# Patient Record
Sex: FEMALE | Race: WHITE
[De-identification: names, ages, dates, MRNs, and addresses within clinical notes are randomized per-mention and may not be internally consistent; named-entity substitution may affect disease eponyms.]

---

## 2019-04-01 ENCOUNTER — HOSPITAL ENCOUNTER (INPATIENT)
Dept: HOSPITAL 62 - ER | Age: 44
LOS: 4 days | Discharge: HOME | DRG: 203 | End: 2019-04-05
Attending: FAMILY MEDICINE | Admitting: FAMILY MEDICINE
Payer: SELF-PAY

## 2019-04-01 DIAGNOSIS — J45.901: Primary | ICD-10-CM

## 2019-04-01 DIAGNOSIS — Z88.2: ICD-10-CM

## 2019-04-01 DIAGNOSIS — D72.829: ICD-10-CM

## 2019-04-01 DIAGNOSIS — Z79.52: ICD-10-CM

## 2019-04-01 DIAGNOSIS — Z79.51: ICD-10-CM

## 2019-04-01 DIAGNOSIS — Z90.710: ICD-10-CM

## 2019-04-01 LAB
ADD MANUAL DIFF: NO
ALBUMIN SERPL-MCNC: 3.9 G/DL (ref 3.5–5)
ALP SERPL-CCNC: 99 U/L (ref 38–126)
ALT SERPL-CCNC: 20 U/L (ref 9–52)
ANION GAP SERPL CALC-SCNC: 9 MMOL/L (ref 5–19)
ARTERIAL BLOOD FIO2: (no result)
ARTERIAL BLOOD H2CO3: 1 MMOL/L (ref 1.05–1.35)
ARTERIAL BLOOD HCO3: 21.3 MMOL/L (ref 20–24)
ARTERIAL BLOOD PCO2: 33.1 MMHG (ref 35–45)
ARTERIAL BLOOD PH: 7.43 (ref 7.35–7.45)
ARTERIAL BLOOD PO2: 62.3 MMHG (ref 80–100)
ARTERIAL BLOOD TOTAL CO2: 22.3 MMOL/L (ref 21–25)
AST SERPL-CCNC: 15 U/L (ref 14–36)
BASE EXCESS BLDA CALC-SCNC: -2.3 MMOL/L
BASOPHILS # BLD AUTO: 0.1 10^3/UL (ref 0–0.2)
BASOPHILS NFR BLD AUTO: 0.4 % (ref 0–2)
BILIRUB DIRECT SERPL-MCNC: 0.2 MG/DL (ref 0–0.4)
BILIRUB SERPL-MCNC: 0.6 MG/DL (ref 0.2–1.3)
BUN SERPL-MCNC: 8 MG/DL (ref 7–20)
CALCIUM: 9.6 MG/DL (ref 8.4–10.2)
CHLORIDE SERPL-SCNC: 108 MMOL/L (ref 98–107)
CO2 SERPL-SCNC: 22 MMOL/L (ref 22–30)
EOSINOPHIL # BLD AUTO: 0.3 10^3/UL (ref 0–0.6)
EOSINOPHIL NFR BLD AUTO: 1.8 % (ref 0–6)
ERYTHROCYTE [DISTWIDTH] IN BLOOD BY AUTOMATED COUNT: 14.3 % (ref 11.5–14)
GLUCOSE SERPL-MCNC: 150 MG/DL (ref 75–110)
HCT VFR BLD CALC: 39.6 % (ref 36–47)
HGB BLD-MCNC: 13.1 G/DL (ref 12–15.5)
LYMPHOCYTES # BLD AUTO: 0.8 10^3/UL (ref 0.5–4.7)
LYMPHOCYTES NFR BLD AUTO: 5.3 % (ref 13–45)
MCH RBC QN AUTO: 29.3 PG (ref 27–33.4)
MCHC RBC AUTO-ENTMCNC: 33 G/DL (ref 32–36)
MCV RBC AUTO: 89 FL (ref 80–97)
MONOCYTES # BLD AUTO: 0.4 10^3/UL (ref 0.1–1.4)
MONOCYTES NFR BLD AUTO: 2.3 % (ref 3–13)
NEUTROPHILS # BLD AUTO: 13.5 10^3/UL (ref 1.7–8.2)
NEUTS SEG NFR BLD AUTO: 90.2 % (ref 42–78)
PLATELET # BLD: 392 10^3/UL (ref 150–450)
POTASSIUM SERPL-SCNC: 4.2 MMOL/L (ref 3.6–5)
PROT SERPL-MCNC: 7.2 G/DL (ref 6.3–8.2)
RBC # BLD AUTO: 4.46 10^6/UL (ref 3.72–5.28)
SAO2 % BLDA: 92.7 % (ref 94–98)
SODIUM SERPL-SCNC: 138.7 MMOL/L (ref 137–145)
TOTAL CELLS COUNTED % (AUTO): 100 %
WBC # BLD AUTO: 15 10^3/UL (ref 4–10.5)

## 2019-04-01 PROCEDURE — 36415 COLL VENOUS BLD VENIPUNCTURE: CPT

## 2019-04-01 PROCEDURE — 87070 CULTURE OTHR SPECIMN AEROBIC: CPT

## 2019-04-01 PROCEDURE — 80048 BASIC METABOLIC PNL TOTAL CA: CPT

## 2019-04-01 PROCEDURE — 96361 HYDRATE IV INFUSION ADD-ON: CPT

## 2019-04-01 PROCEDURE — 83735 ASSAY OF MAGNESIUM: CPT

## 2019-04-01 PROCEDURE — 80053 COMPREHEN METABOLIC PANEL: CPT

## 2019-04-01 PROCEDURE — 96375 TX/PRO/DX INJ NEW DRUG ADDON: CPT

## 2019-04-01 PROCEDURE — 71046 X-RAY EXAM CHEST 2 VIEWS: CPT

## 2019-04-01 PROCEDURE — 83605 ASSAY OF LACTIC ACID: CPT

## 2019-04-01 PROCEDURE — 94640 AIRWAY INHALATION TREATMENT: CPT

## 2019-04-01 PROCEDURE — 96374 THER/PROPH/DIAG INJ IV PUSH: CPT

## 2019-04-01 PROCEDURE — 36600 WITHDRAWAL OF ARTERIAL BLOOD: CPT

## 2019-04-01 PROCEDURE — 87040 BLOOD CULTURE FOR BACTERIA: CPT

## 2019-04-01 PROCEDURE — 99285 EMERGENCY DEPT VISIT HI MDM: CPT

## 2019-04-01 PROCEDURE — 82803 BLOOD GASES ANY COMBINATION: CPT

## 2019-04-01 PROCEDURE — 87205 SMEAR GRAM STAIN: CPT

## 2019-04-01 PROCEDURE — 83880 ASSAY OF NATRIURETIC PEPTIDE: CPT

## 2019-04-01 PROCEDURE — 94762 N-INVAS EAR/PLS OXIMTRY CONT: CPT

## 2019-04-01 PROCEDURE — 85025 COMPLETE CBC W/AUTO DIFF WBC: CPT

## 2019-04-01 RX ADMIN — AZITHROMYCIN MONOHYDRATE SCH MLS/HR: 500 INJECTION, POWDER, LYOPHILIZED, FOR SOLUTION INTRAVENOUS at 21:29

## 2019-04-01 RX ADMIN — SODIUM CHLORIDE PRN MLS/HR: 9 INJECTION, SOLUTION INTRAVENOUS at 18:08

## 2019-04-01 RX ADMIN — ALBUTEROL SULFATE SCH MG: 2.5 SOLUTION RESPIRATORY (INHALATION) at 09:38

## 2019-04-01 RX ADMIN — ALBUTEROL SULFATE SCH MG: 2.5 SOLUTION RESPIRATORY (INHALATION) at 09:26

## 2019-04-01 RX ADMIN — FAMOTIDINE SCH MG: 20 TABLET, FILM COATED ORAL at 21:25

## 2019-04-01 RX ADMIN — CEFEPIME HYDROCHLORIDE SCH MLS/HR: 2 INJECTION, SOLUTION INTRAVENOUS at 18:09

## 2019-04-01 RX ADMIN — METHYLPREDNISOLONE SODIUM SUCCINATE SCH MG: 125 INJECTION, POWDER, FOR SOLUTION INTRAMUSCULAR; INTRAVENOUS at 21:25

## 2019-04-01 RX ADMIN — LEVALBUTEROL HYDROCHLORIDE SCH MG: 1.25 SOLUTION RESPIRATORY (INHALATION) at 19:57

## 2019-04-01 NOTE — PDOC H&P
History of Present Illness


Admission Date/PCP: 


  04/01/19 15:07





  OFE ACEVEDO MD





Patient complains of: Cough and wheezing


History of Present Illness: 


BRET LABOY is a 43 year old female


This is a 43-year-old female with a significant history of the asthma with the 

multiple hospital admissions for the acute exacerbations currently on a steroid 

dependence prednisone 20 mg p.o. daily see Dr. Guthrie's pretty much as 

outpatient seen couple of weeks back seen by myself in the last hospital 

admission in August did not following office came to the emergency department 

with a complaining of cough congestions and increasing more productivity of the 

sputum


Patient's denied any fever or chills


In the emergency department patient oxygen saturation is 90-93%'s and dropped to

up to 89%'s patients received a several respiratory treatments Solu-Medrol and 

IV magnesium's


Patient's chest x-ray was clear


At this point discussed with Dr. Guthrie's patient's pulmonary and suggest a 

hospital admission give IV Solu-Medrol and respiratory treatments did not think 

patient need a CT of the chest today


When I saw the patient in the emergency department as usual still quite a bit 

wheezing


Patient is denied any chest pain


Patient's denied recently any travel or contact with any sick persons


At this point decided to admit the patient's to continues to monitor continues 

to pulse ox and oxygen supplements





Past Medical History


Cardiac Medical History: 


   Denies: Coronary Artery Disease, Myocardial Infarction, Hypertension


Pulmonary Medical History: Reports: Asthma - Hospitalized March & April 2015, 

Pneumonia - 3-4 yrs ago


   Denies: Bronchitis, Chronic Obstructive Pulmonary Disease (COPD), Intubation,

Tuberculosis


Neurological Medical History: 


   Denies: Seizures


Musculoskeltal Medical History: 


   Denies: Arthritis


Hematology: Reports: Anemia - Currently





Past Surgical History


Past Surgical History: Reports: Hysterectomy, Orthopedic Surgery - RIGHT ANKLE 

ORIF, Tubal Ligation


   Denies: Pacemaker





Social History


Lives with: Family


Smoking Status: Never Smoker


Frequency of Alcohol Use: None


Hx Recreational Drug Use: No


Drugs: None


Hx Prescription Drug Abuse: No





Family History


Family History: Reviewed & Not Pertinent


Parental Family History Reviewed: Yes


Children Family History Reviewed: Yes


Sibling(s) Family History Reviewed.: Yes





Medication/Allergy


Home Medications: 








Albuterol Sulfate [Ventolin 0.083% Neb 2.5 mg/3 ml Ampul] 1 vial NEB Q4HP PRN 

04/01/19 


Prednisone [Deltasone 20 mg Tablet] 20 mg PO DAILY 04/01/19 


Tiotropium Bromide [Spiriva Handihaler 5 Cap/Kit (18 Mcg/Cap)] 1 cap IH Q12 

04/01/19 








Allergies/Adverse Reactions: 


                                        





Sulfa (Sulfonamide Antibiotics) Allergy (Verified 04/01/19 08:27)


   











Review of Systems


Constitutional: ABSENT: chills, fever(s), headache(s), weight gain, weight loss


Eyes: ABSENT: visual disturbances


Ears: ABSENT: hearing changes


Cardiovascular: PRESENT: dyspnea on exertion.  ABSENT: chest pain, edema, 

orthropnea, palpitations


Respiratory: PRESENT: cough, dyspnea, sputum.  ABSENT: hemoptysis


Gastrointestinal: ABSENT: abdominal pain, constipation, diarrhea, hematemesis, 

hematochezia, nausea, vomiting


Genitourinary: ABSENT: dysuria, hematuria


Musculoskeletal: ABSENT: joint swelling


Integumentary: ABSENT: rash, wounds


Neurological: ABSENT: abnormal gait, abnormal speech, confusion, dizziness, 

focal weakness, syncope


Psychiatric: ABSENT: anxiety, depression, homidical ideation, suicidal ideation


Endocrine: ABSENT: cold intolerance, heat intolerance, menstrual abnormalities, 

polydipsia, polyuria


Hematologic/Lymphatic: ABSENT: easy bleeding, easy bruising, lymphadenopathy





Physical Exam


Vital Signs: 


                                        











Temp Pulse Resp BP Pulse Ox


 


 98.2 F   106 H  19   132/78 H  95 


 


 04/01/19 08:33  04/01/19 08:33  04/01/19 12:00  04/01/19 10:01  04/01/19 12:00








                                 Intake & Output











 03/31/19 04/01/19 04/02/19





 06:59 06:59 06:59


 


Intake Total   1100


 


Balance   1100


 


Weight   111.4 kg











General appearance: PRESENT: no acute distress, well-developed, well-nourished


Head exam: PRESENT: atraumatic, normocephalic


Eye exam: PRESENT: conjunctiva pink, EOMI, PERRLA.  ABSENT: scleral icterus


Ear exam: PRESENT: normal external ear exam


Mouth exam: PRESENT: moist, tongue midline


Neck exam: PRESENT: full ROM.  ABSENT: carotid bruit, JVD, lymphadenopathy, 

thyromegaly


Respiratory exam: PRESENT: decreased breath sounds, unlabored, wheezes


Cardiovascular exam: PRESENT: RRR, tachycardia.  ABSENT: diastolic murmur, rubs,

systolic murmur


Vascular exam: PRESENT: normal capillary refill


GI/Abdominal exam: PRESENT: normal bowel sounds, soft.  ABSENT: distended, 

guarding, mass, organolmegaly, rebound, tenderness


Rectal exam: PRESENT: deferred


Extremities exam: ABSENT: pedal edema


Neurological exam: PRESENT: alert, awake, oriented to person, oriented to place,

oriented to time, oriented to situation, CN II-XII grossly intact.  ABSENT: 

motor sensory deficit


Psychiatric exam: PRESENT: appropriate affect, normal mood.  ABSENT: homicidal 

ideation, suicidal ideation


Skin exam: PRESENT: dry, intact, warm.  ABSENT: cyanosis, rash





Results


Laboratory Results: 


                                        





                                 04/01/19 09:45 





                                 04/01/19 09:45 





                                        











  04/01/19 04/01/19 04/01/19





  09:16 09:45 09:45


 


WBC   15.0 H 


 


RBC   4.46 


 


Hgb   13.1 


 


Hct   39.6 


 


MCV   89 


 


MCH   29.3 


 


MCHC   33.0 


 


RDW   14.3 H 


 


Plt Count   392 


 


Seg Neutrophils %   90.2 H 


 


Lymphocytes %   5.3 L 


 


Monocytes %   2.3 L 


 


Eosinophils %   1.8 


 


Basophils %   0.4 


 


Absolute Neutrophils   13.5 H 


 


Absolute Lymphocytes   0.8 


 


Absolute Monocytes   0.4 


 


Absolute Eosinophils   0.3 


 


Absolute Basophils   0.1 


 


Carbonic Acid  1.00 L  


 


HCO3/H2CO3 Ratio  21:1  


 


ABG pH  7.43  


 


ABG pCO2  33.1 L  


 


ABG pO2  62.3 L  


 


ABG HCO3  21.3  


 


ABG O2 Saturation  92.7 L  


 


ABG Base Excess  -2.3  


 


FiO2  ROOM AIR  


 


Sodium    138.7


 


Potassium    4.2


 


Chloride    108 H


 


Carbon Dioxide    22


 


Anion Gap    9


 


BUN    8


 


Creatinine    0.73


 


Est GFR ( Amer)    > 60


 


Est GFR (Non-Af Amer)    > 60


 


Glucose    150 H


 


Calcium    9.6


 


Magnesium    2.2


 


Total Bilirubin    0.6


 


AST    15


 


ALT    20


 


Alkaline Phosphatase    99


 


Total Protein    7.2


 


Albumin    3.9











Impressions: 


                                        





Chest X-Ray  04/01/19 08:55


IMPRESSION:  NO ACUTE RADIOGRAPHIC FINDING IN THE CHEST.


 














Assessment & Plan





- Diagnosis


(1) Asthma exacerbation


Qualifiers: 


   Asthma severity: unspecified severity   Asthma persistence: persistent   

Qualified Code(s): J45.901 - Unspecified asthma with (acute) exacerbation   


Is this a current diagnosis for this admission?: Yes   


Plan: 


We will start the patient on IV Solu-Medrol and respiratory treatment continuous

pulse ox monitor


Discussed with the Dr. Guthrie's consult with him following the hospitals








(2) Cough


Is this a current diagnosis for this admission?: Yes   


Plan: 


Will get the sputum culture


Cover with antibiotic








(3) Leukocytosis


Qualifiers: 


   Leukocytosis type: unspecified   Qualified Code(s): D72.829 - Elevated white 

blood cell count, unspecified   


Is this a current diagnosis for this admission?: Yes   


Plan: 


Most likely related to this is chronic steroid


Will get the sputum culture blood culture


Get the lactic acid


Cover the antibiotics until the cultures back








- Time


Time Spent: 30 to 50 Minutes


Medications reviewed and adjusted accordingly: Yes


Anticipated discharge: Home


Within: Other





- Inpatient Certification


Based on my medical assessment, after consideration of the patient's 

comorbidities, presenting symptoms, or acuity I expect that the services needed 

warrant INPATIENT care.: Yes


I certify that my determination is in accordance with my understanding of 

Medicare's requirements for reasonable and necessary INPATIENT services [42 CFR 

412.3e].: Yes


Medical Necessity: Significant Comorbidiites Make Outpatient Treatment Too 

Risky, Need Close Monitoring Due to Risk of Patient Decompensation, Need For IV 

Fluids, Need for IV Antibiotics


Post Hospital Care: D/C Planner Documentation





- Plan Summary


Plan Summary: 





Admit the patient in a telemetry bed


See MD orders

## 2019-04-01 NOTE — RADIOLOGY REPORT (SQ)
EXAM DESCRIPTION:  CHEST 2 VIEWS



COMPLETED DATE/TIME:  4/1/2019 11:00 am



REASON FOR STUDY:  sob,cough



COMPARISON:  3/21/2018



EXAM PARAMETERS:  NUMBER OF VIEWS: two views

TECHNIQUE: Digital Frontal and Lateral radiographic views of the chest acquired.

RADIATION DOSE: NA

LIMITATIONS: none



FINDINGS:  LUNGS AND PLEURA: No opacities, masses or pneumothorax. No pleural effusion.

MEDIASTINUM AND HILAR STRUCTURES: No masses or contour abnormalities.

HEART AND VASCULAR STRUCTURES: Heart normal size.  No evidence for failure.

BONES: No acute findings.

HARDWARE: None in the chest.

OTHER: No other significant finding.



IMPRESSION:  NO ACUTE RADIOGRAPHIC FINDING IN THE CHEST.



TECHNICAL DOCUMENTATION:  JOB ID:  [id number]

 2011 [employer]- All Rights Reserved



Reading location - IP/workstation name: [email or web address]

## 2019-04-01 NOTE — ER DOCUMENT REPORT
ED Respiratory Problem





- General


Chief Complaint: Shortness Of Breath


Stated Complaint: WHEEZING/DIFFICULTY BREATHING


Time Seen by Provider: 04/01/19 08:42


Mode of Arrival: Ambulatory


Information source: Patient


Notes: 





Patient presents with a 3-day history of cough and wheezing.  Patient states 

that the cough has been productive.  Patient denies any fever.  Patient has a 

history of asthma that has been difficult to control.  Patient states she has 

been taking prednisone 20 mg daily to help with her symptoms for over the past 

year.  Patient denies any recent changes in her medications.


TRAVEL OUTSIDE OF THE [place] IN LAST 30 DAYS: No





- HPI


Patient complains to provider of: Asthma, Cough


Onset: Other - 3 days


Duration: Continuous


Quality of pain: Other - Tightness


Pain Level: 1


Context: Hx asthma.  denies: Hx CHF, Hx COPD, Recent cardiac event, Recent long 

distance trvl, Recent immobilization


Chest pain/discomfort: Tightness


Cough: Productive


Sputum color: Yellow


At home treatment: Bronchodilators


Associated symptoms: Allergy/hay fever, Congestion, Cough, Short of breath, 

Wheezing.  denies: Anxiety, Chest pain/discomfort, Earache, Fever


Similar symptoms previously: Yes


Recently seen / treated by doctor: No





- Related Data


Allergies/Adverse Reactions: 


                                        





Sulfa (Sulfonamide Antibiotics) Allergy (Verified 04/01/19 08:27)


   











Past Medical History





- General


Information source: Patient





- Social History


Smoking Status: Never Smoker


Frequency of alcohol use: None


Drug Abuse: None


Occupation: 


Lives with: Family


Family History: Reviewed & Not Pertinent


Patient has suicidal ideation: No


Patient has homicidal ideation: No





- Past Medical History


Cardiac Medical History: 


   Denies: Hx Coronary Artery Disease, Hx Heart Attack, Hx Hypertension


Pulmonary Medical History: Reports: Hx Asthma - Hospitalized March & April 2015,

Hx Pneumonia - 3-4 yrs ago


   Denies: Hx Intubation


Renal/ Medical History: Denies: Hx Peritoneal Dialysis


Musculoskeletal Medical History: Denies Hx Arthritis


Past Surgical History: Reports: Hx Breast Surgery - left breast lump removed, Hx

Genitourinary Surgery - Bladder tuck, Hx Hysterectomy, Hx Orthopedic Surgery - 

RIGHT ANKLE ORIF, Hx Tubal Ligation





- Immunizations


Hx Diphtheria, Pertussis, Tetanus Vaccination: Yes


Hx Pneumococcal Vaccination: 03/30/15





Review of Systems





- Review of Systems


Constitutional: No symptoms reported.  denies: Fever, Recent illness


EENT: Nose congestion, Nose discharge.  denies: Throat pain


Cardiovascular: No symptoms reported


Respiratory: Cough, Wheezing


Gastrointestinal: No symptoms reported.  denies: Abdominal pain, Vomiting


Genitourinary: No symptoms reported


Female Genitourinary: No symptoms reported


Musculoskeletal: No symptoms reported.  denies: Back pain


Skin: No symptoms reported


Hematologic/Lymphatic: No symptoms reported


Neurological/Psychological: No symptoms reported





Physical Exam





- Vital signs


Vitals: 


                                        











Temp Pulse Resp BP Pulse Ox


 


 98.2 F   106 H  20   134/83 H  93 


 


 04/01/19 08:33  04/01/19 08:33  04/01/19 08:33  04/01/19 08:33  04/01/19 08:33














- General


General appearance: Alert


In distress: Mild





- HEENT


Head: Normocephalic, Atraumatic


Eyes: Normal


Conjunctiva: Normal


Ears: Normal


Nasal: Clear rhinorrhea


Mouth/Lips: Normal


Mucous membranes: Normal


Pharynx: Normal.  No: Potential airway comprom.


Neck: Normal, Supple.  No: Lymphadenopathy, Meningismus





- Respiratory


Respiratory status: Tachypnea


Chest status: Nontender


Breath sounds: Nonproductive cough, Wheezing


Chest palpation: Normal





- Cardiovascular


Rhythm: Tachycardia


Heart sounds: S1 appreciated, S2 appreciated


Murmur: No





- Back


Back: Normal, Nontender





- Extremities


General upper extremity: Normal inspection, Normal ROM


General lower extremity: Normal inspection, Normal ROM





- Neurological


Neuro grossly intact: Yes


Cognition: Normal


Bridgman Coma Scale Eye Opening: Spontaneous


Bridgman Coma Scale Verbal: Oriented


Jean [place] Coma Scale Motor: Obeys Commands


Jean [place] Coma Scale Total: 15





- Psychological


Associated symptoms: Normal affect, Normal mood





- Skin


Skin Temperature: Warm


Skin Moisture: Dry


Skin Color: Normal





Course





- Re-evaluation


Re-evalutation: 





04/01/19 10:32


Patient continues with diffuse bilateral wheezing.  Heart rate continues 

tachycardic with oxygen saturation at 93% on room air.  IV magnesium ordered.


04/01/19 14:10 Called and spoke with Dr. Tong regarding need for admission.  

Recommends consultation with Dr. Guthrie.





Called and spoke with Dr. Guthrie who states that given patient's presentation 

would recommend admission.


04/01/19 14:22


Patient standing at bedside, heart rate 127 with oxygen saturation 89-92%.  

Patient placed on oxygen 1 L.  Patient does continue with lateral wheezing.  

Call placed for consultation with Dr. Tong.


04/01/19 14:55


 paged Dr. Tong





04/01/19 15:03


Consulted again with Dr. Tong who agrees to accept patient to a telemetry bed.





- Vital Signs


Vital signs: 


                                        











Temp Pulse Resp BP Pulse Ox


 


 98.8 F   114 H  19   122/70   98 


 


 04/01/19 18:08  04/01/19 18:08  04/01/19 18:08  04/01/19 18:08  04/01/19 18:08














- Laboratory


Result Diagrams: 


                                 04/01/19 09:45





                                 04/01/19 09:45


Laboratory results interpreted by me: 


                                        











  04/01/19 04/01/19 04/01/19





  09:16 09:45 09:45


 


WBC   15.0 H 


 


RDW   14.3 H 


 


Seg Neutrophils %   90.2 H 


 


Lymphocytes %   5.3 L 


 


Monocytes %   2.3 L 


 


Absolute Neutrophils   13.5 H 


 


Carbonic Acid  1.00 L  


 


ABG pCO2  33.1 L  


 


ABG pO2  62.3 L  


 


ABG O2 Saturation  92.7 L  


 


Chloride    108 H


 


Glucose    150 H











                               Labs- Entire Visit











  04/01/19 04/01/19 04/01/19





  09:16 09:45 09:45


 


WBC   15.0 H 


 


RBC   4.46 


 


Hgb   13.1 


 


Hct   39.6 


 


MCV   89 


 


MCH   29.3 


 


MCHC   33.0 


 


RDW   14.3 H 


 


Plt Count   392 


 


Seg Neutrophils %   90.2 H 


 


Lymphocytes %   5.3 L 


 


Monocytes %   2.3 L 


 


Eosinophils %   1.8 


 


Basophils %   0.4 


 


Absolute Neutrophils   13.5 H 


 


Absolute Lymphocytes   0.8 


 


Absolute Monocytes   0.4 


 


Absolute Eosinophils   0.3 


 


Absolute Basophils   0.1 


 


Carbonic Acid  1.00 L  


 


HCO3/H2CO3 Ratio  21:1  


 


ABG pH  7.43  


 


ABG pCO2  33.1 L  


 


ABG pO2  62.3 L  


 


ABG HCO3  21.3  


 


ABG Total CO2  22.3  


 


ABG O2 Saturation  92.7 L  


 


ABG Base Excess  -2.3  


 


FiO2  ROOM AIR  


 


Sodium    138.7


 


Potassium    4.2


 


Chloride    108 H


 


Carbon Dioxide    22


 


Anion Gap    9


 


BUN    8


 


Creatinine    0.73


 


Est GFR ( Amer)    > 60


 


Est GFR (Non-Af Amer)    > 60


 


Glucose    150 H


 


Calcium    9.6


 


Magnesium    2.2


 


Total Bilirubin    0.6


 


Direct Bilirubin    0.2


 


Neonat Total Bilirubin    Not Reportable


 


Neonat Direct Bilirubin    Not Reportable


 


Neonat Indirect Bili    Not Reportable


 


AST    15


 


ALT    20


 


Alkaline Phosphatase    99


 


Total Protein    7.2


 


Albumin    3.9














- Diagnostic Test


Radiology reviewed: Reports reviewed





Discharge





- Discharge


Clinical Impression: 


Asthma exacerbation


Qualifiers:


 Asthma severity: unspecified severity Asthma persistence: persistent Qualified 

Code(s): J45.901 - Unspecified asthma with (acute) exacerbation





Leukocytosis


Qualifiers:


 Leukocytosis type: unspecified Qualified Code(s): D72.829 - Elevated white 

blood cell count, unspecified





Condition: Fair


Disposition: ADMITTED AS OBSERVATION


Admitting Provider: Tong


Unit Admitted: Telemetry

## 2019-04-02 LAB
ABSOLUTE LYMPHOCYTES# (MANUAL): 1.5 10^3/UL (ref 0.5–4.7)
ABSOLUTE MONOCYTES # (MANUAL): 0 10^3/UL (ref 0.1–1.4)
ABSOLUTE NEUTROPHILS# (MANUAL): 17.5 10^3/UL (ref 1.7–8.2)
ADD MANUAL DIFF: YES
ANION GAP SERPL CALC-SCNC: 10 MMOL/L (ref 5–19)
ANISOCYTOSIS BLD QL SMEAR: SLIGHT
ARTERIAL BLOOD FIO2: (no result)
ARTERIAL BLOOD H2CO3: 0.93 MMOL/L (ref 1.05–1.35)
ARTERIAL BLOOD HCO3: 19.8 MMOL/L (ref 20–24)
ARTERIAL BLOOD PCO2: 30.8 MMHG (ref 35–45)
ARTERIAL BLOOD PH: 7.43 (ref 7.35–7.45)
ARTERIAL BLOOD PO2: 62.2 MMHG (ref 80–100)
ARTERIAL BLOOD TOTAL CO2: 20.7 MMOL/L (ref 21–25)
BASE EXCESS BLDA CALC-SCNC: -3.5 MMOL/L
BASOPHILS NFR BLD MANUAL: 0 % (ref 0–2)
BUN SERPL-MCNC: 12 MG/DL (ref 7–20)
BURR CELLS BLD QL SMEAR: SLIGHT
CALCIUM: 9.8 MG/DL (ref 8.4–10.2)
CHLORIDE SERPL-SCNC: 111 MMOL/L (ref 98–107)
CO2 SERPL-SCNC: 18 MMOL/L (ref 22–30)
EOSINOPHIL NFR BLD MANUAL: 0 % (ref 0–6)
ERYTHROCYTE [DISTWIDTH] IN BLOOD BY AUTOMATED COUNT: 14.2 % (ref 11.5–14)
GLUCOSE SERPL-MCNC: 175 MG/DL (ref 75–110)
HCT VFR BLD CALC: 39.3 % (ref 36–47)
HGB BLD-MCNC: 12.7 G/DL (ref 12–15.5)
MCH RBC QN AUTO: 28.3 PG (ref 27–33.4)
MCHC RBC AUTO-ENTMCNC: 32.2 G/DL (ref 32–36)
MCV RBC AUTO: 88 FL (ref 80–97)
MONOCYTES % (MANUAL): 0 % (ref 3–13)
OVALOCYTES BLD QL SMEAR: SLIGHT
PLATELET # BLD: 350 10^3/UL (ref 150–450)
PLATELET COMMENT: ADEQUATE
POIKILOCYTOSIS BLD QL SMEAR: (no result)
POTASSIUM SERPL-SCNC: 4.6 MMOL/L (ref 3.6–5)
RBC # BLD AUTO: 4.47 10^6/UL (ref 3.72–5.28)
SAO2 % BLDA: 92.7 % (ref 94–98)
SCHISTOCYTES BLD QL SMEAR: SLIGHT
SEGMENTED NEUTROPHILS % (MAN): 92 % (ref 42–78)
SODIUM SERPL-SCNC: 138.9 MMOL/L (ref 137–145)
TOTAL CELLS COUNTED BLD: 100
TOXIC GRANULES BLD QL SMEAR: (no result)
VARIANT LYMPHS NFR BLD MANUAL: 8 % (ref 13–45)
WBC # BLD AUTO: 19 10^3/UL (ref 4–10.5)

## 2019-04-02 RX ADMIN — LEVALBUTEROL HYDROCHLORIDE SCH MG: 1.25 SOLUTION RESPIRATORY (INHALATION) at 07:55

## 2019-04-02 RX ADMIN — LEVALBUTEROL HYDROCHLORIDE SCH MG: 1.25 SOLUTION RESPIRATORY (INHALATION) at 12:07

## 2019-04-02 RX ADMIN — METHYLPREDNISOLONE SODIUM SUCCINATE SCH MG: 125 INJECTION, POWDER, FOR SOLUTION INTRAMUSCULAR; INTRAVENOUS at 05:29

## 2019-04-02 RX ADMIN — AZITHROMYCIN MONOHYDRATE SCH MLS/HR: 500 INJECTION, POWDER, LYOPHILIZED, FOR SOLUTION INTRAVENOUS at 22:00

## 2019-04-02 RX ADMIN — ENOXAPARIN SODIUM SCH MG: 40 INJECTION SUBCUTANEOUS at 09:57

## 2019-04-02 RX ADMIN — METHYLPREDNISOLONE SODIUM SUCCINATE SCH MG: 125 INJECTION, POWDER, FOR SOLUTION INTRAMUSCULAR; INTRAVENOUS at 22:00

## 2019-04-02 RX ADMIN — CEFEPIME HYDROCHLORIDE SCH MLS/HR: 2 INJECTION, SOLUTION INTRAVENOUS at 05:31

## 2019-04-02 RX ADMIN — FAMOTIDINE SCH MG: 20 TABLET, FILM COATED ORAL at 23:19

## 2019-04-02 RX ADMIN — LEVALBUTEROL HYDROCHLORIDE SCH MG: 1.25 SOLUTION RESPIRATORY (INHALATION) at 03:37

## 2019-04-02 RX ADMIN — FAMOTIDINE SCH MG: 20 TABLET, FILM COATED ORAL at 09:57

## 2019-04-02 RX ADMIN — METHYLPREDNISOLONE SODIUM SUCCINATE SCH MG: 125 INJECTION, POWDER, FOR SOLUTION INTRAMUSCULAR; INTRAVENOUS at 14:00

## 2019-04-02 RX ADMIN — LEVALBUTEROL HYDROCHLORIDE SCH MG: 1.25 SOLUTION RESPIRATORY (INHALATION) at 16:25

## 2019-04-02 RX ADMIN — SODIUM CHLORIDE PRN MLS/HR: 9 INJECTION, SOLUTION INTRAVENOUS at 07:59

## 2019-04-02 RX ADMIN — LEVALBUTEROL HYDROCHLORIDE SCH: 1.25 SOLUTION RESPIRATORY (INHALATION) at 20:58

## 2019-04-02 RX ADMIN — CEFEPIME HYDROCHLORIDE SCH MLS/HR: 2 INJECTION, SOLUTION INTRAVENOUS at 17:40

## 2019-04-02 RX ADMIN — LEVALBUTEROL HYDROCHLORIDE SCH MG: 1.25 SOLUTION RESPIRATORY (INHALATION) at 00:09

## 2019-04-02 NOTE — PDOC PROGRESS REPORT
Subjective


Progress Note for:: 04/02/19


Subjective:: 





Patient is feeling much better


Denied any chest pain to than any shortness of the breath


Patient heart rate is slightly elevated which is very common for this patient 

who evaluations did in the past most likely related to the respiratory 

treatments


Patient's lactic acid was elevated currently on IV antibiotic but patient is 

currently denied any fever no chills





Reason For Visit: 


ASTHMA WITH ACUTE EXACERBATIONS








Physical Exam


Vital Signs: 


                                        











Temp Pulse Resp BP Pulse Ox


 


 97.5 F   119 H  16   123/72   96 


 


 04/02/19 00:00  04/02/19 07:55  04/02/19 07:55  04/02/19 00:00  04/02/19 07:55








                                        





Pulse Oximeter Continuous                                  Start:  04/01/19 

22:21


Freq:                                                      Status: Active       




Protocol:                                                                       




 Document     04/02/19 07:55  [facility]  (Rec: 04/02/19 08:09  [facility]  [id number])


 Pulse Oximetry Assessment


     Oxygen Saturation ()                  96


     Oxygen Flow Rate (L/min)                    1.5


     Oxygen Delivery Method                      Nasal Cannula


     Fraction of Inspired Oxygen (FIO2)          26


     Equipment Usage                             Equipment in Use


     Continuous SpO2 Machine #                   N [id number]





                                 Intake & Output











 04/01/19 04/02/19 04/03/19





 06:59 06:59 06:59


 


Intake Total  1810 970


 


Balance  1810 970


 


Weight  115.2 kg 











General appearance: PRESENT: no acute distress, well-developed, well-nourished


Head exam: PRESENT: atraumatic, normocephalic


Eye exam: PRESENT: conjunctiva pink, EOMI, PERRLA.  ABSENT: scleral icterus


Ear exam: PRESENT: normal external ear exam


Mouth exam: PRESENT: moist, tongue midline


Neck exam: PRESENT: full ROM.  ABSENT: carotid bruit, JVD, lymphadenopathy, 

thyromegaly


Respiratory exam: PRESENT: wheezes


Cardiovascular exam: PRESENT: RRR, tachycardia.  ABSENT: diastolic murmur, rubs,

systolic murmur


Vascular exam: PRESENT: normal capillary refill


GI/Abdominal exam: PRESENT: normal bowel sounds, soft.  ABSENT: distended, 

guarding, mass, organolmegaly, rebound, tenderness


Rectal exam: PRESENT: deferred


Musculoskeletal exam: PRESENT: ambulatory


Neurological exam: PRESENT: alert, awake, oriented to person, oriented to place,

oriented to time, oriented to situation, CN II-XII grossly intact.  ABSENT: mo

tor sensory deficit


Psychiatric exam: PRESENT: appropriate affect, normal mood.  ABSENT: homicidal 

ideation, suicidal ideation


Skin exam: PRESENT: dry, intact, warm.  ABSENT: cyanosis, rash





Results


Laboratory Results: 


                                        





                                 04/02/19 05:18 





                                 04/02/19 05:18 





                                        











  04/01/19 04/01/19 04/02/19





  16:00 20:50 05:18


 


WBC   


 


RBC   


 


Hgb   


 


Hct   


 


MCV   


 


MCH   


 


MCHC   


 


RDW   


 


Plt Count   


 


Seg Neutrophils %   


 


Lymphocytes %   


 


Monocytes %   


 


Eosinophils %   


 


Basophils %   


 


Absolute Neutrophils   


 


Absolute Lymphocytes   


 


Absolute Monocytes   


 


Absolute Eosinophils   


 


Absolute Basophils   


 


Carbonic Acid   


 


HCO3/H2CO3 Ratio   


 


ABG pH   


 


ABG pCO2   


 


ABG pO2   


 


ABG HCO3   


 


ABG O2 Saturation   


 


ABG Base Excess   


 


FiO2   


 


Sodium   


 


Potassium   


 


Chloride   


 


Carbon Dioxide   


 


Anion Gap   


 


BUN   


 


Creatinine   


 


Est GFR ( Amer)   


 


Est GFR (Non-Af Amer)   


 


Glucose   


 


Lactic Acid  4.5 H  5.0 H  3.1 H


 


Calcium   














  04/02/19 04/02/19 04/02/19





  05:18 05:18 06:10


 


WBC  19.0 H  


 


RBC  4.47  


 


Hgb  12.7  


 


Hct  39.3  


 


MCV  88  


 


MCH  28.3  


 


MCHC  32.2  


 


RDW  14.2 H  


 


Plt Count  350  


 


Seg Neutrophils %  Not Reportable  


 


Lymphocytes %  Not Reportable  


 


Monocytes %  Not Reportable  


 


Eosinophils %  Not Reportable  


 


Basophils %  Not Reportable  


 


Absolute Neutrophils  Not Reportable  


 


Absolute Lymphocytes  Not Reportable  


 


Absolute Monocytes  Not Reportable  


 


Absolute Eosinophils  Not Reportable  


 


Absolute Basophils  Not Reportable  


 


Carbonic Acid    0.93 L


 


HCO3/H2CO3 Ratio    21:1


 


ABG pH    7.43


 


ABG pCO2    30.8 L


 


ABG pO2    62.2 L


 


ABG HCO3    19.8 L


 


ABG O2 Saturation    92.7 L


 


ABG Base Excess    -3.5


 


FiO2    28%


 


Sodium   138.9 


 


Potassium   4.6 


 


Chloride   111 H 


 


Carbon Dioxide   18 L 


 


Anion Gap   10 


 


BUN   12 


 


Creatinine   0.71 


 


Est GFR ( Amer)   > 60 


 


Est GFR (Non-Af Amer)   > 60 


 


Glucose   175 H 


 


Lactic Acid   


 


Calcium   9.8 








                                        











  04/02/19





  05:18


 


NT-Pro-B Natriuret Pep  76











Impressions: 


                                        





Chest X-Ray  04/01/19 08:55


IMPRESSION:  NO ACUTE RADIOGRAPHIC FINDING IN THE CHEST.


 














Assessment & Plan





- Diagnosis


(1) Asthma exacerbation


Qualifiers: 


   Asthma severity: unspecified severity   Asthma persistence: persistent   

Qualified Code(s): J45.901 - Unspecified asthma with (acute) exacerbation   


Is this a current diagnosis for this admission?: Yes   


Plan: 


Reduce the IV steroids


Reduce the nebulizer treatment every 6 hours








(2) Cough


Is this a current diagnosis for this admission?: Yes   


Plan: 


Continues to Mucinex








(3) Leukocytosis


Qualifiers: 


   Leukocytosis type: unspecified   Qualified Code(s): D72.829 - Elevated white 

blood cell count, unspecified   


Is this a current diagnosis for this admission?: Yes   


Plan: 


Continues to IV antibiotic coverage








- Time


Time Spent with patient: 15-24 minutes


Medications reviewed and adjusted accordingly: Yes


Anticipated discharge: Home


Within: Other





- Plan Summary


Plan Summary: 





Follow with the Dr. Guthrie


Continues to current medications

## 2019-04-03 LAB
ABSOLUTE LYMPHOCYTES# (MANUAL): 1.1 10^3/UL (ref 0.5–4.7)
ABSOLUTE MONOCYTES # (MANUAL): 0.9 10^3/UL (ref 0.1–1.4)
ABSOLUTE NEUTROPHILS# (MANUAL): 19.9 10^3/UL (ref 1.7–8.2)
ADD MANUAL DIFF: YES
ANION GAP SERPL CALC-SCNC: 10 MMOL/L (ref 5–19)
ANISOCYTOSIS BLD QL SMEAR: SLIGHT
BASOPHILS NFR BLD MANUAL: 0 % (ref 0–2)
BUN SERPL-MCNC: 18 MG/DL (ref 7–20)
CALCIUM: 10 MG/DL (ref 8.4–10.2)
CHLORIDE SERPL-SCNC: 108 MMOL/L (ref 98–107)
CO2 SERPL-SCNC: 19 MMOL/L (ref 22–30)
DACRYOCYTES BLD QL SMEAR: SLIGHT
EOSINOPHIL NFR BLD MANUAL: 0 % (ref 0–6)
ERYTHROCYTE [DISTWIDTH] IN BLOOD BY AUTOMATED COUNT: 14.2 % (ref 11.5–14)
GLUCOSE SERPL-MCNC: 142 MG/DL (ref 75–110)
HCT VFR BLD CALC: 39.5 % (ref 36–47)
HGB BLD-MCNC: 12.6 G/DL (ref 12–15.5)
MCH RBC QN AUTO: 28.3 PG (ref 27–33.4)
MCHC RBC AUTO-ENTMCNC: 31.9 G/DL (ref 32–36)
MCV RBC AUTO: 89 FL (ref 80–97)
MONOCYTES % (MANUAL): 4 % (ref 3–13)
NEUTS BAND NFR BLD MANUAL: 1 % (ref 3–5)
PLATELET # BLD: 379 10^3/UL (ref 150–450)
PLATELET CLUMP BLD QL SMEAR: PRESENT
PLATELET COMMENT: ADEQUATE
POIKILOCYTOSIS BLD QL SMEAR: SLIGHT
POLYCHROMASIA BLD QL SMEAR: SLIGHT
POTASSIUM SERPL-SCNC: 4.7 MMOL/L (ref 3.6–5)
RBC # BLD AUTO: 4.45 10^6/UL (ref 3.72–5.28)
SEGMENTED NEUTROPHILS % (MAN): 90 % (ref 42–78)
SODIUM SERPL-SCNC: 137.2 MMOL/L (ref 137–145)
TOTAL CELLS COUNTED BLD: 100
VARIANT LYMPHS NFR BLD MANUAL: 5 % (ref 13–45)
WBC # BLD AUTO: 21.9 10^3/UL (ref 4–10.5)
WBC TOXIC VACUOLES BLD QL SMEAR: PRESENT

## 2019-04-03 RX ADMIN — ENOXAPARIN SODIUM SCH MG: 40 INJECTION SUBCUTANEOUS at 09:00

## 2019-04-03 RX ADMIN — METHYLPREDNISOLONE SODIUM SUCCINATE SCH MG: 125 INJECTION, POWDER, FOR SOLUTION INTRAMUSCULAR; INTRAVENOUS at 06:36

## 2019-04-03 RX ADMIN — LEVALBUTEROL HYDROCHLORIDE SCH MG: 1.25 SOLUTION RESPIRATORY (INHALATION) at 00:17

## 2019-04-03 RX ADMIN — LEVALBUTEROL HYDROCHLORIDE SCH MG: 1.25 SOLUTION RESPIRATORY (INHALATION) at 04:25

## 2019-04-03 RX ADMIN — FAMOTIDINE SCH MG: 20 TABLET, FILM COATED ORAL at 21:06

## 2019-04-03 RX ADMIN — FAMOTIDINE SCH MG: 20 TABLET, FILM COATED ORAL at 09:00

## 2019-04-03 RX ADMIN — METHYLPREDNISOLONE SODIUM SUCCINATE SCH MG: 40 INJECTION, POWDER, FOR SOLUTION INTRAMUSCULAR; INTRAVENOUS at 13:57

## 2019-04-03 RX ADMIN — LEVALBUTEROL HYDROCHLORIDE SCH MG: 1.25 SOLUTION RESPIRATORY (INHALATION) at 08:15

## 2019-04-03 RX ADMIN — LEVALBUTEROL HYDROCHLORIDE SCH MG: 1.25 SOLUTION RESPIRATORY (INHALATION) at 12:21

## 2019-04-03 RX ADMIN — LEVALBUTEROL HYDROCHLORIDE SCH MG: 1.25 SOLUTION RESPIRATORY (INHALATION) at 20:44

## 2019-04-03 RX ADMIN — LEVALBUTEROL HYDROCHLORIDE SCH MG: 1.25 SOLUTION RESPIRATORY (INHALATION) at 16:00

## 2019-04-03 RX ADMIN — CEFEPIME HYDROCHLORIDE SCH MLS/HR: 2 INJECTION, SOLUTION INTRAVENOUS at 05:19

## 2019-04-03 RX ADMIN — METHYLPREDNISOLONE SODIUM SUCCINATE SCH MG: 40 INJECTION, POWDER, FOR SOLUTION INTRAMUSCULAR; INTRAVENOUS at 21:06

## 2019-04-03 RX ADMIN — CEFEPIME HYDROCHLORIDE SCH MLS/HR: 2 INJECTION, SOLUTION INTRAVENOUS at 17:23

## 2019-04-03 NOTE — PDOC PROGRESS REPORT
Subjective


Progress Note for:: 04/03/19


Subjective:: 





Patient is feeling much better


Patient is off the oxygen's


Patient is denied any chest pain to than any shortness of the breath


Reason For Visit: 


ASTHMA WITH ACUTE EXACERBATIONS








Physical Exam


Vital Signs: 


                                        











Temp Pulse Resp BP Pulse Ox


 


 98.4 F   102 H  18   137/79 H  96 


 


 04/03/19 00:00  04/03/19 07:00  04/03/19 04:26  04/03/19 00:00  04/03/19 04:26








                                        





Pulse Oximeter Continuous                                  Start:  04/01/19 

22:21


Freq:                                                      Status: Complete     




Protocol:                                                                       




 Document     04/02/19 12:07  [facility] [place]  (Rec: 04/02/19 12:20  [facility] [place]  [id number])


 Pulse Oximetry Assessment


     Oxygen Saturation ()                  93


     Oxygen Delivery Method                      Room Air


     Fraction of Inspired Oxygen (FIO2)          21


     Equipment Usage                             Equipment Discontinued


     Continuous SpO2 Machine #                   N [id number]





                                 Intake & Output











 04/02/19 04/03/19 04/04/19





 06:59 06:59 06:59


 


Intake Total 1810 2896 


 


Balance 1810 2896 


 


Weight 115.2 kg  











General appearance: PRESENT: no acute distress, well-developed, well-nourished


Head exam: PRESENT: atraumatic, normocephalic


Eye exam: PRESENT: conjunctiva pink, EOMI, PERRLA.  ABSENT: scleral icterus


Ear exam: PRESENT: normal external ear exam


Mouth exam: PRESENT: moist, tongue midline


Neck exam: PRESENT: full ROM.  ABSENT: carotid bruit, JVD, lymphadenopathy, 

thyromegaly


Respiratory exam: PRESENT: clear to auscultation sari


Cardiovascular exam: PRESENT: RRR.  ABSENT: diastolic murmur, rubs, systolic 

murmur


Vascular exam: PRESENT: normal capillary refill


GI/Abdominal exam: PRESENT: normal bowel sounds, soft.  ABSENT: distended, 

guarding, mass, organolmegaly, rebound, tenderness


Rectal exam: PRESENT: deferred


Extremities exam: ABSENT: pedal edema


Musculoskeletal exam: PRESENT: ambulatory


Neurological exam: PRESENT: alert, awake, oriented to person, oriented to place,

oriented to time, oriented to situation, CN II-XII grossly intact.  ABSENT: 

motor sensory deficit


Psychiatric exam: PRESENT: appropriate affect, normal mood.  ABSENT: homicidal 

ideation, suicidal ideation


Skin exam: PRESENT: dry, intact, warm.  ABSENT: cyanosis, rash





Results


Laboratory Results: 


                                        











  04/03/19





  07:07


 


Seg Neutrophils %  Not Reportable


 


Lymphocytes %  Not Reportable


 


Monocytes %  Not Reportable


 


Eosinophils %  Not Reportable


 


Basophils %  Not Reportable


 


Absolute Neutrophils  Not Reportable


 


Absolute Lymphocytes  Not Reportable


 


Absolute Monocytes  Not Reportable


 


Absolute Eosinophils  Not Reportable


 


Absolute Basophils  Not Reportable








                                        











  04/02/19





  05:18


 


NT-Pro-B Natriuret Pep  76











Impressions: 


                                        





Chest X-Ray  04/01/19 08:55


IMPRESSION:  NO ACUTE RADIOGRAPHIC FINDING IN THE CHEST.


 














Assessment & Plan





- Diagnosis


(1) Asthma exacerbation


Qualifiers: 


   Asthma severity: unspecified severity   Asthma persistence: persistent   

Qualified Code(s): J45.901 - Unspecified asthma with (acute) exacerbation   


Is this a current diagnosis for this admission?: Yes   


Plan: 


Currently all improving


Reduce the IV steroid








(2) Cough


Is this a current diagnosis for this admission?: Yes   


Plan: 


Continues to Mucinex








(3) Leukocytosis


Qualifiers: 


   Leukocytosis type: unspecified   Qualified Code(s): D72.829 - Elevated white 

blood cell count, unspecified   


Is this a current diagnosis for this admission?: Yes   


Plan: 


Blood culture is so far negative


Sputum culture is still pending


Cover with the antibiotic








- Time


Time Spent with patient: 15-24 minutes


Medications reviewed and adjusted accordingly: Yes


Anticipated discharge: Home


Within: within 48 hours





- Plan Summary


Plan Summary: 





Continues to current medication as able changes

## 2019-04-03 NOTE — RADIOLOGY REPORT (SQ)
EXAM DESCRIPTION:  CHEST 2 VIEWS



COMPLETED DATE/TIME:  4/3/2019 5:23 pm



REASON FOR STUDY:  asthma



COMPARISON:  4/1/2019.



EXAM PARAMETERS:  NUMBER OF VIEWS: two views

TECHNIQUE: Digital Frontal and Lateral radiographic views of the chest acquired.

RADIATION DOSE: NA

LIMITATIONS: none



FINDINGS:  LUNGS AND PLEURA: No opacities, masses or pneumothorax. No pleural effusion.

MEDIASTINUM AND HILAR STRUCTURES: No masses or contour abnormalities.

HEART AND VASCULAR STRUCTURES: Heart normal size.  No evidence for failure.

BONES: No acute findings.

HARDWARE: None in the chest.

OTHER: No other significant finding.



IMPRESSION:  NO ACUTE RADIOGRAPHIC FINDING IN THE CHEST.



TECHNICAL DOCUMENTATION:  JOB ID:  [id number]

 2011 [employer]- All Rights Reserved



Reading location - IP/workstation name: [email or web address]

## 2019-04-04 LAB
ABSOLUTE LYMPHOCYTES# (MANUAL): 1.7 10^3/UL (ref 0.5–4.7)
ABSOLUTE MONOCYTES # (MANUAL): 0.6 10^3/UL (ref 0.1–1.4)
ABSOLUTE NEUTROPHILS# (MANUAL): 18.5 10^3/UL (ref 1.7–8.2)
ADD MANUAL DIFF: YES
ANION GAP SERPL CALC-SCNC: 8 MMOL/L (ref 5–19)
BASOPHILS NFR BLD MANUAL: 0 % (ref 0–2)
BUN SERPL-MCNC: 21 MG/DL (ref 7–20)
CALCIUM: 9.7 MG/DL (ref 8.4–10.2)
CHLORIDE SERPL-SCNC: 108 MMOL/L (ref 98–107)
CO2 SERPL-SCNC: 21 MMOL/L (ref 22–30)
EOSINOPHIL NFR BLD MANUAL: 0 % (ref 0–6)
ERYTHROCYTE [DISTWIDTH] IN BLOOD BY AUTOMATED COUNT: 14.1 % (ref 11.5–14)
GLUCOSE SERPL-MCNC: 140 MG/DL (ref 75–110)
HCT VFR BLD CALC: 37.3 % (ref 36–47)
HGB BLD-MCNC: 12.1 G/DL (ref 12–15.5)
MCH RBC QN AUTO: 28.9 PG (ref 27–33.4)
MCHC RBC AUTO-ENTMCNC: 32.5 G/DL (ref 32–36)
MCV RBC AUTO: 89 FL (ref 80–97)
MONOCYTES % (MANUAL): 3 % (ref 3–13)
PLATELET # BLD: 347 10^3/UL (ref 150–450)
PLATELET COMMENT: ADEQUATE
POTASSIUM SERPL-SCNC: 4.5 MMOL/L (ref 3.6–5)
RBC # BLD AUTO: 4.19 10^6/UL (ref 3.72–5.28)
RBC MORPH BLD: (no result)
SEGMENTED NEUTROPHILS % (MAN): 89 % (ref 42–78)
SODIUM SERPL-SCNC: 136.7 MMOL/L (ref 137–145)
TOTAL CELLS COUNTED BLD: 100
VARIANT LYMPHS NFR BLD MANUAL: 8 % (ref 13–45)
WBC # BLD AUTO: 20.8 10^3/UL (ref 4–10.5)
WBC TOXIC VACUOLES BLD QL SMEAR: PRESENT

## 2019-04-04 RX ADMIN — DOXYCYCLINE HYCLATE SCH MG: 100 TABLET ORAL at 12:16

## 2019-04-04 RX ADMIN — LEVALBUTEROL HYDROCHLORIDE SCH MG: 1.25 SOLUTION RESPIRATORY (INHALATION) at 07:46

## 2019-04-04 RX ADMIN — CEFEPIME HYDROCHLORIDE SCH MLS/HR: 2 INJECTION, SOLUTION INTRAVENOUS at 05:27

## 2019-04-04 RX ADMIN — LEVALBUTEROL HYDROCHLORIDE SCH: 1.25 SOLUTION RESPIRATORY (INHALATION) at 00:03

## 2019-04-04 RX ADMIN — METHYLPREDNISOLONE SODIUM SUCCINATE SCH MG: 40 INJECTION, POWDER, FOR SOLUTION INTRAMUSCULAR; INTRAVENOUS at 05:26

## 2019-04-04 RX ADMIN — ENOXAPARIN SODIUM SCH MG: 40 INJECTION SUBCUTANEOUS at 09:14

## 2019-04-04 RX ADMIN — DOXYCYCLINE HYCLATE SCH MG: 100 TABLET ORAL at 22:02

## 2019-04-04 RX ADMIN — FAMOTIDINE SCH MG: 20 TABLET, FILM COATED ORAL at 09:14

## 2019-04-04 RX ADMIN — FAMOTIDINE SCH MG: 20 TABLET, FILM COATED ORAL at 22:02

## 2019-04-04 RX ADMIN — LEVALBUTEROL HYDROCHLORIDE SCH MG: 1.25 SOLUTION RESPIRATORY (INHALATION) at 03:29

## 2019-04-04 RX ADMIN — LEVALBUTEROL HYDROCHLORIDE SCH MG: 1.25 SOLUTION RESPIRATORY (INHALATION) at 12:06

## 2019-04-04 RX ADMIN — LEVALBUTEROL HYDROCHLORIDE SCH MG: 1.25 SOLUTION RESPIRATORY (INHALATION) at 15:57

## 2019-04-04 RX ADMIN — LEVALBUTEROL HYDROCHLORIDE SCH MG: 1.25 SOLUTION RESPIRATORY (INHALATION) at 21:53

## 2019-04-04 NOTE — PDOC PROGRESS REPORT
Subjective


Progress Note for:: 04/04/19


Subjective:: 





Patient is currently doing well


Patient is denied any chest pain to than any shortness of the breath


No fever


Patient's white count is stable patient still elevated I believe most likely 

from the steroid


Patient is walking the hallway without any problems


Reason For Visit: 


ASTHMA WITH ACUTE EXACERBATIONS








Physical Exam


Vital Signs: 


                                        











Temp Pulse Resp BP Pulse Ox


 


 98.0 F   89   16   115/79   95 


 


 04/04/19 07:39  04/04/19 12:07  04/04/19 12:07  04/04/19 07:39  04/04/19 12:07








                                        





Pulse Oximeter Continuous                                  Start:  04/01/19 

22:21


Freq:                                                      Status: Complete     




Protocol:                                                                       




 Document     04/02/19 12:07  [facility] [place]Rec: 04/02/19 12:20  [facility] [place]  [email or web address]


 Pulse Oximetry Assessment


     Oxygen Saturation ()                  93


     Oxygen Delivery Method                      Room Air


     Fraction of Inspired Oxygen (FIO2)          21


     Equipment Usage                             Equipment Discontinued


     Continuous SpO2 Machine #                   N 11





                                 Intake & Output











 04/03/19 04/04/19 04/05/19





 06:59 06:59 06:59


 


Intake Total 2896 1138 


 


Balance 2896 1138 


 


Weight  115.2 kg 











General appearance: PRESENT: no acute distress, well-developed, well-nourished


Head exam: PRESENT: atraumatic, normocephalic


Eye exam: PRESENT: conjunctiva pink, EOMI, PERRLA.  ABSENT: scleral icterus


Ear exam: PRESENT: normal external ear exam


Mouth exam: PRESENT: moist, tongue midline


Neck exam: PRESENT: full ROM.  ABSENT: carotid bruit, JVD, lymphadenopathy, 

thyromegaly


Respiratory exam: PRESENT: clear to auscultation sari


Cardiovascular exam: PRESENT: RRR.  ABSENT: diastolic murmur, rubs, systolic 

murmur


Vascular exam: PRESENT: normal capillary refill


GI/Abdominal exam: PRESENT: normal bowel sounds, soft.  ABSENT: distended, 

guarding, mass, organolmegaly, rebound, tenderness


Rectal exam: PRESENT: deferred


Extremities exam: ABSENT: pedal edema


Musculoskeletal exam: PRESENT: ambulatory


Neurological exam: PRESENT: alert, awake, oriented to person, oriented to place,

oriented to time, oriented to situation, CN II-XII grossly intact.  ABSENT: 

motor sensory deficit


Psychiatric exam: PRESENT: appropriate affect, normal mood.  ABSENT: homicidal 

ideation, suicidal ideation


Skin exam: PRESENT: dry, intact, warm.  ABSENT: cyanosis, rash





Results


Laboratory Results: 


                                        





                                 04/04/19 05:58 





                                 04/04/19 05:58 





                                        











  04/04/19 04/04/19 04/04/19





  05:58 05:58 05:58


 


WBC   20.8 H 


 


RBC   4.19 


 


Hgb   12.1 


 


Hct   37.3 


 


MCV   89 


 


MCH   28.9 


 


MCHC   32.5 


 


RDW   14.1 H 


 


Plt Count   347 


 


Seg Neutrophils %   Not Reportable 


 


Lymphocytes %   Not Reportable 


 


Monocytes %   Not Reportable 


 


Eosinophils %   Not Reportable 


 


Basophils %   Not Reportable 


 


Absolute Neutrophils   Not Reportable 


 


Absolute Lymphocytes   Not Reportable 


 


Absolute Monocytes   Not Reportable 


 


Absolute Eosinophils   Not Reportable 


 


Absolute Basophils   Not Reportable 


 


Sodium    136.7 L


 


Potassium    4.5


 


Chloride    108 H


 


Carbon Dioxide    21 L


 


Anion Gap    8


 


BUN    21 H


 


Creatinine    0.84


 


Est GFR ( Amer)    > 60


 


Est GFR (Non-Af Amer)    > 60


 


Glucose    140 H


 


Lactic Acid  2.3 H  


 


Calcium    9.7








                                        











  04/02/19





  05:18


 


NT-Pro-B Natriuret Pep  76











Impressions: 


                                        





Chest X-Ray  04/03/19 00:00


IMPRESSION:  NO ACUTE RADIOGRAPHIC FINDING IN THE CHEST.


 














Assessment & Plan





- Diagnosis


(1) Asthma exacerbation


Qualifiers: 


   Asthma severity: unspecified severity   Asthma persistence: persistent   

Qualified Code(s): J45.901 - Unspecified asthma with (acute) exacerbation   


Is this a current diagnosis for this admission?: Yes   


Plan: 


Will DC the IV steroid start on a p.o. steroid








(2) Cough


Is this a current diagnosis for this admission?: Yes   


Plan: 


Continues to Mucinex








(3) Leukocytosis


Qualifiers: 


   Leukocytosis type: unspecified   Qualified Code(s): D72.829 - Elevated white 

blood cell count, unspecified   


Is this a current diagnosis for this admission?: Yes   


Plan: 


DC the IV antibiotics while patients remain afebrile


Start on the doxycycline


Continues the current other medications


Repeat the CBC in the morning


Patient's wants to go home will see tomorrow if the remains stable hopefully 

discharge at








- Time


Time Spent with patient: 15-24 minutes


Medications reviewed and adjusted accordingly: Yes


Anticipated discharge: Home


Within: within 24 hours





- Plan Summary


Plan Summary: 





Continues to current medications

## 2019-04-05 VITALS — SYSTOLIC BLOOD PRESSURE: 129 MMHG | DIASTOLIC BLOOD PRESSURE: 70 MMHG

## 2019-04-05 LAB
ADD MANUAL DIFF: NO
ANION GAP SERPL CALC-SCNC: 7 MMOL/L (ref 5–19)
BASOPHILS # BLD AUTO: 0 10^3/UL (ref 0–0.2)
BASOPHILS NFR BLD AUTO: 0.3 % (ref 0–2)
BUN SERPL-MCNC: 20 MG/DL (ref 7–20)
CALCIUM: 9.1 MG/DL (ref 8.4–10.2)
CHLORIDE SERPL-SCNC: 105 MMOL/L (ref 98–107)
CO2 SERPL-SCNC: 27 MMOL/L (ref 22–30)
EOSINOPHIL # BLD AUTO: 0 10^3/UL (ref 0–0.6)
EOSINOPHIL NFR BLD AUTO: 0.2 % (ref 0–6)
ERYTHROCYTE [DISTWIDTH] IN BLOOD BY AUTOMATED COUNT: 14.2 % (ref 11.5–14)
GLUCOSE SERPL-MCNC: 100 MG/DL (ref 75–110)
HCT VFR BLD CALC: 37.5 % (ref 36–47)
HGB BLD-MCNC: 12.4 G/DL (ref 12–15.5)
LYMPHOCYTES # BLD AUTO: 2.1 10^3/UL (ref 0.5–4.7)
LYMPHOCYTES NFR BLD AUTO: 13.4 % (ref 13–45)
MCH RBC QN AUTO: 29 PG (ref 27–33.4)
MCHC RBC AUTO-ENTMCNC: 33 G/DL (ref 32–36)
MCV RBC AUTO: 88 FL (ref 80–97)
MONOCYTES # BLD AUTO: 1.2 10^3/UL (ref 0.1–1.4)
MONOCYTES NFR BLD AUTO: 7.5 % (ref 3–13)
NEUTROPHILS # BLD AUTO: 12.4 10^3/UL (ref 1.7–8.2)
NEUTS SEG NFR BLD AUTO: 78.6 % (ref 42–78)
PLATELET # BLD: 373 10^3/UL (ref 150–450)
POTASSIUM SERPL-SCNC: 4.1 MMOL/L (ref 3.6–5)
RBC # BLD AUTO: 4.28 10^6/UL (ref 3.72–5.28)
SODIUM SERPL-SCNC: 139.3 MMOL/L (ref 137–145)
TOTAL CELLS COUNTED % (AUTO): 100 %
WBC # BLD AUTO: 15.8 10^3/UL (ref 4–10.5)

## 2019-04-05 RX ADMIN — LEVALBUTEROL HYDROCHLORIDE SCH MG: 1.25 SOLUTION RESPIRATORY (INHALATION) at 00:49

## 2019-04-05 RX ADMIN — LEVALBUTEROL HYDROCHLORIDE SCH MG: 1.25 SOLUTION RESPIRATORY (INHALATION) at 08:37

## 2019-04-05 RX ADMIN — LEVALBUTEROL HYDROCHLORIDE SCH MG: 1.25 SOLUTION RESPIRATORY (INHALATION) at 04:33

## 2019-04-05 NOTE — PDOC DISCHARGE SUMMARY
General





- Admit/Disc Date/PCP


Admission Date/Primary Care Provider: 


  04/01/19 15:24





  OFE ACEVEDO MD





Discharge Date: 04/05/19





- Discharge Diagnosis


(1) Asthma exacerbation


Is this a current diagnosis for this admission?: Yes   


Summary: 


Currently all resolving


Follow with the Dr. Guthrie as outpatients








(2) Cough


Is this a current diagnosis for this admission?: Yes   


Summary: 


Currently all improving








(3) Leukocytosis


Is this a current diagnosis for this admission?: Yes   


Summary: 


Currently all improving


Patients remain afebrile


Patient lactic acid is normal


Most likely related to the steroid


Patients covered with the doxycycline








- Additional Information


Discharge Diet: Regular


Discharge Activity: Activity As Tolerated, Balance Activity w/Rest, Slowly 

Increase Activity


Prescriptions: 


Doxycycline Hyclate [Vibramycin 100 mg Tablet] 100 mg PO Q12 #14 tablet


Home Medications: 








Albuterol Sulfate [Ventolin 0.083% Neb 2.5 mg/3 mL Ampul] 1 vial NEB Q4HP PRN 

04/01/19 


Prednisone [Deltasone 20 mg Tablet] 20 mg PO DAILY 04/01/19 


Tiotropium Bromide [Spiriva Handihaler 5 Cap/Kit (18 Mcg/Cap)] 1 cap IH Q12 

04/01/19 


Doxycycline Hyclate [Vibramycin 100 mg Tablet] 100 mg PO Q12 #14 tablet 04/05/19













History of Present Illness


History of Present Illness: 


BRET LABOY is a 43 year old female


This is a 43-year-old female with a significant history of the asthma with the 

multiple hospital admissions for the acute exacerbations currently on a steroid 

dependence prednisone 20 mg p.o. daily see Dr. Guthrie's pretty much as 

outpatient seen couple of weeks back seen by myself in the last hospital 

admission in August did not following office came to the emergency department 

with a complaining of cough congestions and increasing more productivity of the 

sputum


Patient's denied any fever or chills


In the emergency department patient oxygen saturation is 90-93%'s and dropped to

up to 89%'s patients received a several respiratory treatments Solu-Medrol and 

IV magnesium's


Patient's chest x-ray was clear


At this point discussed with Dr. Guthrie's patient's pulmonary and suggest a 

hospital admission give IV Solu-Medrol and respiratory treatments did not think 

patient need a CT of the chest today


When I saw the patient in the emergency department as usual still quite a bit 

wheezing


Patient is denied any chest pain


Patient's denied recently any travel or contact with any sick persons


At this point decided to admit the patient's to continues to monitor continues 

to pulse ox and oxygen supplements





Hospital Course


Hospital Course: 





This is a 43-year-old female with a significant history of the asthma very 

extensive evaluations done at [facility] seen by the Dr. Guthrie is a local pulmonary 

l and a chronic steroid therapy came to the emergency department with shortness 

of the breath bilateral wheezing with asthma with acute exacerbations


Patient admitting in the hospital start on IV steroid and IV antibiotic


Discussed with the Dr. Guthrie's and suggest that continues to as above 

medications


Patient's response very well and IV steroid was introduced and switched to the 

p.o. steroids


Patient's blood culture and a sputum culture is all stable


Patient to white count was elevated which happen all the time when patient in 

the hospital which most likely steroid induced


But patient is covered with the antibiotic due to this bronchitis


Patient's discharge with the doxycycline


The patient has remained afebrile


Patient's white count is all normal patient's


Oxygen saturation is all normal with the room air


Patient is walking the hallway without any problems


Follow outpatient Dr. Guthrie in 1 week


Follow in office 1 week





Physical Exam


Vital Signs: 


                                        











Temp Pulse Resp BP Pulse Ox


 


 97.9 F   85   16   129/70 H  97 


 


 04/05/19 08:36  04/05/19 08:36  04/05/19 08:36  04/05/19 08:36  04/05/19 08:36








                                        





Pulse Oximeter Continuous                                  Start:  04/01/19 22:2

1


Freq:                                                      Status: Complete     




Protocol:                                                                       




 Document     04/02/19 12:07  [facility] [place]  (Rec: 04/02/19 12:20  [facility] [place]  [email or web address])


 Pulse Oximetry Assessment


     Oxygen Saturation ()                  93


     Oxygen Delivery Method                      Room Air


     Fraction of Inspired Oxygen (FIO2)          21


     Equipment Usage                             Equipment Discontinued


     Continuous SpO2 Machine #                   N 11





                                 Intake & Output











 04/04/19 04/05/19 04/06/19





 06:59 06:59 06:59


 


Intake Total 1138 1075 


 


Balance 1138 1075 


 


Weight 115.2 kg 115.2 kg 











General appearance: PRESENT: no acute distress, well-developed, well-nourished


Head exam: PRESENT: atraumatic, normocephalic


Eye exam: PRESENT: conjunctiva pink, EOMI, PERRLA.  ABSENT: scleral icterus


Ear exam: PRESENT: normal external ear exam


Mouth exam: PRESENT: moist, tongue midline


Neck exam: PRESENT: full ROM.  ABSENT: carotid bruit, JVD, lymphadenopathy, 

thyromegaly


Respiratory exam: PRESENT: clear to auscultation sari


Cardiovascular exam: PRESENT: RRR.  ABSENT: diastolic murmur, rubs, systolic 

murmur


Vascular exam: PRESENT: normal capillary refill


GI/Abdominal exam: PRESENT: normal bowel sounds, soft.  ABSENT: distended, 

guarding, mass, organolmegaly, rebound, tenderness


Rectal exam: PRESENT: deferred


Extremities exam: ABSENT: pedal edema


Musculoskeletal exam: PRESENT: ambulatory


Neurological exam: PRESENT: alert, awake, oriented to person, oriented to place,

oriented to time, oriented to situation, CN II-XII grossly intact.  ABSENT: 

motor sensory deficit


Psychiatric exam: PRESENT: appropriate affect, normal mood.  ABSENT: homicidal 

ideation, suicidal ideation


Skin exam: PRESENT: dry, intact, warm.  ABSENT: cyanosis, rash





Results


Laboratory Results: 


                                        





                                 04/05/19 06:48 





                                 04/05/19 06:48 





                                        











  04/04/19 04/05/19 04/05/19





  11:53 06:48 06:48


 


WBC   15.8 H 


 


RBC   4.28 


 


Hgb   12.4 


 


Hct   37.5 


 


MCV   88 


 


MCH   29.0 


 


MCHC   33.0 


 


RDW   14.2 H 


 


Plt Count   373 


 


Seg Neutrophils %   78.6 H 


 


Lymphocytes %   13.4 


 


Monocytes %   7.5 


 


Eosinophils %   0.2 


 


Basophils %   0.3 


 


Absolute Neutrophils   12.4 H 


 


Absolute Lymphocytes   2.1 


 


Absolute Monocytes   1.2 


 


Absolute Eosinophils   0.0 


 


Absolute Basophils   0.0 


 


Sodium    139.3


 


Potassium    4.1


 


Chloride    105


 


Carbon Dioxide    27


 


Anion Gap    7


 


BUN    20


 


Creatinine    0.80


 


Est GFR ( Amer)    > 60


 


Est GFR (Non-Af Amer)    > 60


 


Glucose    100


 


Lactic Acid  1.6  


 


Calcium    9.1








                                        











  04/02/19





  05:18


 


NT-Pro-B Natriuret Pep  76











Impressions: 


                                        





Chest X-Ray  04/03/19 00:00


IMPRESSION:  NO ACUTE RADIOGRAPHIC FINDING IN THE CHEST.


 














Qualifiers





- *


PATIENT BEING DISCHARGED WITH ANY OF THE FOLLOWING DIAGNOSIS: No


VTE patient discharged on overlapping Therapy?: Yes





Plan


Time Spent: Greater than 30 Minutes - Continues to current medication as able 

Discharged with a tapering dose of the steroid Follow outpatients pulmonary 

Repeat CBC in 1 week

## 2019-09-12 NOTE — ER DOCUMENT REPORT
Entered by MONI GOTTLIEB SCRIBE  09/12/19 1121 





Acting as scribe for:NGUYEN FERNANDO MD





ED GI/





- General


Chief Complaint: Flank Pain


Stated Complaint: FLANK PAIN


Time Seen by Provider: 09/12/19 11:08


Primary Care Provider: 


OFE ACEVEDO MD [Primary Care Provider] - Follow up as needed


Mode of Arrival: Ambulatory


Information source: Patient


Notes: 





44-year-old female who presents to the emergency department today with 

complaints of right-sided flank pain.  Patient states she has a history of 

kidney stones and it feels similar to her previous kidney stones.  Patient also 

has some wheezing which she states is her asthma flaring up. Patient is on 20mg 

of prednisone daily already for her asthma. 


TRAVEL OUTSIDE OF THE [place] IN LAST 30 DAYS: No





- Related Data


Allergies/Adverse Reactions: 


                                        





Sulfa (Sulfonamide Antibiotics) Allergy (Verified 09/12/19 09:42)


   











Past Medical History





- General


Information source: Patient





- Social History


Smoking Status: Never Smoker


Cigarette use (# per day): No


Chew tobacco use (# tins/day): No


Frequency of alcohol use: None


Drug Abuse: None


Family History: Reviewed & Not Pertinent


Patient has suicidal ideation: No


Patient has homicidal ideation: No


Pulmonary Medical History: Reports: Hx Asthma - Hospitalized March & April 2015,

Hx Pneumonia - 3-4 yrs ago


Past Surgical History: Reports: Hx Breast Surgery - left breast lump removed, Hx

Genitourinary Surgery - Bladder tuck, Hx Hysterectomy, Hx Orthopedic Surgery - 

RIGHT ANKLE ORIF, Hx Tubal Ligation





- Immunizations


Hx Diphtheria, Pertussis, Tetanus Vaccination: Yes


Hx Pneumococcal Vaccination: 03/30/15





Review of Systems





- Review of Systems


Constitutional: No symptoms reported


EENT: No symptoms reported


Cardiovascular: No symptoms reported


Respiratory: See HPI, Wheezing


Gastrointestinal: See HPI, Abdominal pain


Genitourinary: See HPI, Flank pain - right


Female Genitourinary: No symptoms reported


Musculoskeletal: No symptoms reported


Skin: No symptoms reported


Hematologic/Lymphatic: No symptoms reported


Neurological/Psychological: No symptoms reported


-: Yes All other systems reviewed and negative





Physical Exam





- Vital signs


Vitals: 


                                        











Temp Pulse Resp BP Pulse Ox


 


 98.5 F   88   20   157/103 H  93 


 


 09/12/19 09:44  09/12/19 09:44  09/12/19 09:44  09/12/19 09:44  09/12/19 09:44














- Notes


Notes: 





Physical Exam:


 


General: Alert, appears uncomfortable. 


 


HEENT: Normocephalic. Atraumatic. PERRL. Extraocular movements intact. 

Oropharynx clear.


 


Neck: Supple. Non-tender.


 


Respiratory: No respiratory distress. Inspiratory and expiratory wheezing 

bilaterally.


 


Cardiovascular: Regular rate and rhythm. 


 


Abdominal: Obese. RLQ tenderness with palpation. No distension. Normal Bowel 

Sounds. 


 


Back: Right CVA tenderness with percussion. 


 


Extremities: Moves all four extremities.


Upper extremities: Normal inspection. Normal ROM.  


Lower extremities: Normal inspection. No edema. Normal ROM.


 


Neurological: Normal cognition. AAOx4. Normal speech.  


 


Psychological: Normal affect. Normal Mood. 


 


Skin: Warm. Dry. Normal color.





Course





- Vital Signs


Vital signs: 


                                        











Temp Pulse Resp BP Pulse Ox


 


 98.3 F   86   16   130/80 H  97 


 


 09/12/19 12:49  09/12/19 12:49  09/12/19 12:49  09/12/19 12:49  09/12/19 12:49














- Laboratory


Result Diagrams: 


                                 09/12/19 10:10





                                 09/12/19 10:10


Laboratory results interpreted by me: 


                                        











  09/12/19 09/12/19 09/12/19





  10:10 10:10 10:10


 


WBC  17.0 H  


 


MCHC  31.9 L  


 


Absolute Neuts (auto)  13.2 H  


 


Glucose   183 H 


 


Urine Protein    30 H


 


Urine Blood    LARGE H














- Diagnostic Test


Radiology reviewed: Image reviewed, Reports reviewed - Noncontrast CT scan 

abdomen pelvis shows a 3 mm obstructive calculus of the proximal right ureter 

with mild associated hydronephrosis hydroureter.  There are multiple additional 

bilateral nonobstructive renal calculi.  There are no new discrete nodules assoc

iated with bandlike scarring or atelectasis of the partially included medial 

segment of the right middle lobe compared to prior examination on 4/29/2018.  

Follow-up CT exam of the chest to further evaluate is recommended.





Discharge





- Discharge


Clinical Impression: 


 Renal colic on right side, Calculus of proximal right ureter, Bilateral renal 

stones, Pulmonary nodules





Condition: Stable


Disposition: HOME, SELF-CARE


Additional Instructions: 


Kidney Stone:





     You are passing a kidney stone.  These stones are usually due to increased 

calcium or uric acid concentrations in your urine.  Stones within the kidney 

itself are not painful.  The pain occurs as the stone leaves the kidney to pass 

down the long tube, called the ureter, leading to the bladder.


     If the stone is small, it will usually pass by itself.  Most patients can 

pass the stone at home.  You will usually receive medications for pain, nausea 

or vomiting, and sometimes a medication to assist in passing the kidney stone.  

However, if the pain is very severe or if vomiting prevents you from taking oral

pain medications, you may need to return for further treatment.


     Drink three or four quarts of fluids per day.  You will be given pain 

medication (if needed) and urine strainers.  Strain all your urine to see if the

stone passes.  If your doctor has asked you to bring the stone in for analysis, 

return with the stone once it has passed.


     Return if pain or vomiting become severe, if you develop a high fever, if 

you are unable to pass your urine, or if other unusual symptoms occur.








Pulmonary Nodules:





     There were new nodules seen in the right middle lobe of your lung on the CT

scan today compared to the scan done in April 2018.








*******************************

********************************************************************************


*********





Start taking the Flomax tomorrow.


Take ibuprofen 800 mg every 8 hours.


Take the pain medication as prescribed if needed.


Drink plenty of fluids throughout the day in the evening every day.


Strain your urine.


Follow-up with Dr. Acevedo to review your CT scan findings, and for appropriate 

referrals.





RETURN TO THE EMERGENCY ROOM IF ANY NEW OR WORSENING SYMPTOMS.








Prescriptions: 


Tamsulosin HCl [Flomax 0.4 mg Cap.sr] 0.4 mg PO DAILY #7 cap.sr.24h


Oxycodone HCl/Acetaminophen [Percocet 5-325 mg Tablet] 1 tab PO ASDIR PRN #15 

tablet


 PRN Reason: 


Referrals: 


OFE ACEVEDO MD [Primary Care Provider] - Follow up in 3-5 days


Scribe Attestation: 





09/12/19 14:18


I personally performed the services described in the documentation, reviewed and

edited the documentation which was dictated to the scribe in my presence, and it

accurately records my words and actions.





I personally performed the services described in the documentation, reviewed and

edited the documentation which was dictated to the scribe in my presence, and it

accurately records my words and actions.

## 2019-09-12 NOTE — RADIOLOGY REPORT (SQ)
EXAM DESCRIPTION:  CT ABD/PELVIS NO ORAL OR IV



COMPLETED DATE/TIME:  9/12/2019 11:28 am



REASON FOR STUDY:  Right flank pain with hematuria



COMPARISON:  CT abdomen pelvis, 4/29/2018



TECHNIQUE:  CT scan of the abdomen and pelvis performed without intravenous or oral contrast. Images 
reviewed with lung, soft tissue, and bone windows. Reconstructed coronal and sagittal MPR images revi
ewed. All images stored on PACS.

All CT scanners at this facility use dose modulation, iterative reconstruction, and/or weight based d
osing when appropriate to reduce radiation dose to as low as reasonably achievable (ALARA).

[facility]: Dose Right  [facility]: CareDose    [facility]: Dose Right    [facility]: Teradose 4D    [facility]: Smart Technologies



RADIATION DOSE:  1015 mGy cm



LIMITATIONS:  None.



FINDINGS:  LOWER CHEST: There are new discrete nodules associated with bandlike scarring or atelectas
is of the partially included medial segment right middle lobe measuring 1.0 and 0.7 cm.

NON-CONTRASTED LIVER, SPLEEN, ADRENALS: Evaluation limited by lack of IV contrast. No identified sign
ificant masses.

PANCREAS: No masses. No peripancreatic inflammatory changes.

GALLBLADDER: No identified stones by CT criteria. No inflammatory changes to suggest cholecystitis.

RIGHT KIDNEY AND URETER: No suspicious masses. Assessment limited by lack of IV contrast.  There is a
 3 mm obstructive calculus of the proximal right ureter with mild associated right hydronephrosis and
 hydroureter.  Additional nonobstructive calculi.

LEFT KIDNEY AND URETER: No suspicious masses. Assessment limited by lack of IV contrast.   Nonobstruc
tive calculi.   No hydronephrosis or hydroureter.

AORTA AND RETROPERITONEUM: No aneurysm. No retroperitoneal masses or adenopathy.

BOWEL AND PERITONEAL CAVITY: No obvious masses or inflammatory changes. No free fluid.

APPENDIX: Normal.

PELVIS, BLADDER, AND ABDOMINAL WALL:No abnormal masses. No free fluid. Bladder normal.

BONES: No significant findings.

OTHER: No other significant finding.



IMPRESSION:  1. There is a 3 mm obstructive calculus of the proximal right ureter with mild associate
d right hydronephrosis and hydroureter.

2.  Multiple additional bilateral nonobstructive renal calculi.

3. There are new discrete nodules associated with bandlike scarring or atelectasis of the partially i
ncluded medial segment right middle lobe measuring 1.0 and 0.7 cm when compared to prior examination 
dated 4/29/2018.  Recommend follow-up CT examination of the chest to further evaluate.



COMMENT:  Quality ID # 436: Final reports with documentation of one or more dose reduction techniques
 (e.g., Automated exposure control, adjustment of the mA and/or kV according to patient size, use of 
iterative reconstruction technique)



TECHNICAL DOCUMENTATION:  JOB ID:  [id number]

 2011 [employer]- All Rights Reserved



Reading location - IP/workstation name: [email or web address]

## 2019-09-18 NOTE — RADIOLOGY REPORT (SQ)
EXAM DESCRIPTION:  CT CHEST WITHOUT; CT ABD/PELVIS NO ORAL OR IV



COMPLETED DATE/TIME:  9/18/2019 9:15 am



REASON FOR STUDY:  right flank pain



COMPARISON:  9/12/2019



TECHNIQUE:  CT scan of the chest performed without intravenous contrast using helical scanning techni
que.  Images reviewed with lung, soft tissue and bone windows. Reconstructed coronal and sagittal MPR
 images reviewed.  All images stored on PACS.

CT scan of the abdomen and pelvis performed without intravenous contrast and withoutoral contrast usi
ng helical scanning technique with dynamic intravenous contrast injection.  Images reviewed with lung
, soft tissue and bone windows.  Reconstructed coronal and sagittal MPR images reviewed.  All images 
stored on PACS.

All CT scanners at this facility use dose modulation, iterative reconstruction, and/or weight based d
osing when appropriate to reduce radiation dose to as low as reasonably achievable (ALARA).

[facility]: Dose Right  [facility]: CareDose    [facility]: Dose Right    [facility]: Teradose 4D    [facility]: Smart [employer]



RADIATION DOSE:  CT Rad equipment meets quality standard of care and radiation dose reduction techniq
ues were employed. CTDIvol: 20.8 mGy. DLP: 1507 mGy-cm. mGy.



LIMITATIONS:  No technical limitations.



FINDINGS:  CHEST:

AXILLAE: No adenopathy.

CHEST WALL: No masses.  No subcutaneous air.

LUNGS: Mild patchy centrilobular ground-glass nodules within the right middle lobe, largest measuring
 10 mm (series 4, image 39), stable from recent prior.  No additional airspace disease.  No significa
nt pleural effusion.  No pneumothorax.  No discrete solid masses.

THYROID: No masses or significant asymmetry.

HILAR AND MEDIASTINAL STRUCTURES: No identified masses or abnormal nodes.

AORTA AND GREAT VESSELS: No aneurysm.

HEART: No pericardial effusion.

HARDWARE AND LIFELINES: None.

BONES: No significant finding. Mild dextroconvex thoracic curvature.

OTHER: No other significant finding.

ABDOMEN AND PELVIS:

LIVER: Normal size. No masses.  No dilated ducts.

SPLEEN: Normal size.  No focal lesions.

PANCREAS: No masses.  No significant calcifications.  No adjacent inflammation or peripancreatic flui
d collections.  Pancreatic duct not dilated.

GALLBLADDER: No identified stones by CT criteria. No inflammatory changes to suggest cholecystitis.

ADRENAL GLANDS: No significant masses or asymmetry.

RIGHT KIDNEY AND URETER: No solid masses. Assessment limited by lack of IV contrast.  There has been 
progression of the previously seen 4 mm right ureteral stone canal at the level of the ureteral vesic
ular junction.  There is persistent mild fullness of the collecting system upstream.  Additional nono
bstructing stones within the lower pole measuring up to 8 mm and conglomerate are stable.

LEFT KIDNEY AND URETER: No solid masses. Assessment limited by lack of IV contrast.  Unchanged puncta
te nonobstructing stones.  No hydronephrosis.

AORTA AND VESSELS: No aneurysm.

RETROPERITONEUM: No retroperitoneal adenopathy, hemorrhage or masses.

APPENDIX: Normal.

LARGE AND SMALL BOWEL: No dilatation.  No masses.  No wall thickening.

ABDOMINAL WALL: No hernia masses.  Diastases recti.  Obesity.

PERITONEAL CAVITY: No free air.  No free fluid.  No peritoneal implants or masses.

PELVIS: 2 stones within the urinary bladder, largest measuring 5 mm.  No focal bladder wall thickenin
g.  No free pelvic fluid or adenopathy.

BONES: No acute bony abnormality.  No suspicious osseous lesions.

OTHER: No other significant finding.



IMPRESSION:  1.  Mild patchy centrilobular ground-glass nodules within the right middle lobe, likely 
infectious/ inflammatory.  Recommend follow-up to ensure resolution.

2.  Progression of the previously seen 4 mm stone within the right ureter which now resides at the le
elissa of the right ureteral vesicular junction.  Mild persistent upstream hydroureteronephrosis.  Addit
ional 5 mm bladder stone noted.

3.  Additional unchanged bilateral nonobstructing renal stones.



TECHNICAL DOCUMENTATION:  JOB ID:  [id number]

Quality ID # 436: Final reports with documentation of one or more dose reduction techniques (e.g., Au
tomated exposure control, adjustment of the mA and/or kV according to patient size, use of iterative 
reconstruction technique)

 2011 [employer]- All Rights Reserved



Reading location - IP/workstation name: [email or web address]

## 2019-09-18 NOTE — RADIOLOGY REPORT (SQ)
EXAM DESCRIPTION:  CT CHEST WITHOUT; CT ABD/PELVIS NO ORAL OR IV



COMPLETED DATE/TIME:  9/18/2019 9:15 am



REASON FOR STUDY:  right flank pain



COMPARISON:  9/12/2019



TECHNIQUE:  CT scan of the chest performed without intravenous contrast using helical scanning techni
que.  Images reviewed with lung, soft tissue and bone windows. Reconstructed coronal and sagittal MPR
 images reviewed.  All images stored on PACS.

CT scan of the abdomen and pelvis performed without intravenous contrast and withoutoral contrast usi
ng helical scanning technique with dynamic intravenous contrast injection.  Images reviewed with lung
, soft tissue and bone windows.  Reconstructed coronal and sagittal MPR images reviewed.  All images 
stored on PACS.

All CT scanners at this facility use dose modulation, iterative reconstruction, and/or weight based d
osing when appropriate to reduce radiation dose to as low as reasonably achievable (ALARA).

[facility]: Dose Right  [facility]: CareDose    [facility]: Dose Right    [facility]: Teradose 4D    [facility]: Smart [employer]



RADIATION DOSE:  CT Rad equipment meets quality standard of care and radiation dose reduction techniq
ues were employed. CTDIvol: 20.8 mGy. DLP: 1507 mGy-cm. mGy.



LIMITATIONS:  No technical limitations.



FINDINGS:  CHEST:

AXILLAE: No adenopathy.

CHEST WALL: No masses.  No subcutaneous air.

LUNGS: Mild patchy centrilobular ground-glass nodules within the right middle lobe, largest measuring
 10 mm (series 4, image 39), stable from recent prior.  No additional airspace disease.  No significa
nt pleural effusion.  No pneumothorax.  No discrete solid masses.

THYROID: No masses or significant asymmetry.

HILAR AND MEDIASTINAL STRUCTURES: No identified masses or abnormal nodes.

AORTA AND GREAT VESSELS: No aneurysm.

HEART: No pericardial effusion.

HARDWARE AND LIFELINES: None.

BONES: No significant finding. Mild dextroconvex thoracic curvature.

OTHER: No other significant finding.

ABDOMEN AND PELVIS:

LIVER: Normal size. No masses.  No dilated ducts.

SPLEEN: Normal size.  No focal lesions.

PANCREAS: No masses.  No significant calcifications.  No adjacent inflammation or peripancreatic flui
d collections.  Pancreatic duct not dilated.

GALLBLADDER: No identified stones by CT criteria. No inflammatory changes to suggest cholecystitis.

ADRENAL GLANDS: No significant masses or asymmetry.

RIGHT KIDNEY AND URETER: No solid masses. Assessment limited by lack of IV contrast.  There has been 
progression of the previously seen 4 mm right ureteral stone canal at the level of the ureteral vesic
ular junction.  There is persistent mild fullness of the collecting system upstream.  Additional nono
bstructing stones within the lower pole measuring up to 8 mm and conglomerate are stable.

LEFT KIDNEY AND URETER: No solid masses. Assessment limited by lack of IV contrast.  Unchanged puncta
te nonobstructing stones.  No hydronephrosis.

AORTA AND VESSELS: No aneurysm.

RETROPERITONEUM: No retroperitoneal adenopathy, hemorrhage or masses.

APPENDIX: Normal.

LARGE AND SMALL BOWEL: No dilatation.  No masses.  No wall thickening.

ABDOMINAL WALL: No hernia masses.  Diastases recti.  Obesity.

PERITONEAL CAVITY: No free air.  No free fluid.  No peritoneal implants or masses.

PELVIS: 2 stones within the urinary bladder, largest measuring 5 mm.  No focal bladder wall thickenin
g.  No free pelvic fluid or adenopathy.

BONES: No acute bony abnormality.  No suspicious osseous lesions.

OTHER: No other significant finding.



IMPRESSION:  1.  Mild patchy centrilobular ground-glass nodules within the right middle lobe, likely 
infectious/ inflammatory.  Recommend follow-up to ensure resolution.

2.  Progression of the previously seen 4 mm stone within the right ureter which now resides at the le
elissa of the right ureteral vesicular junction.  Mild persistent upstream hydroureteronephrosis.  Addit
ional 5 mm bladder stone noted.

3.  Additional unchanged bilateral nonobstructing renal stones.



TECHNICAL DOCUMENTATION:  JOB ID:  [id number]

Quality ID # 436: Final reports with documentation of one or more dose reduction techniques (e.g., Au
tomated exposure control, adjustment of the mA and/or kV according to patient size, use of iterative 
reconstruction technique)

 2011 [employer]- All Rights Reserved



Reading location - IP/workstation name: [email or web address]

## 2019-09-18 NOTE — ER DOCUMENT REPORT
ED GI/





- General


Chief Complaint: Flank Pain


Stated Complaint: FLANK PAIN


Time Seen by Provider: 09/18/19 08:30


Primary Care Provider: 


[facility] [Provider Group] - Follow up in 3-5 days


OFE ACEVEDO MD [Primary Care Provider] - Follow up in 3-5 days


Notes: 





Patient is a 44-year-old female who presents to the emergency department with a 

chief complaint of right flank pain.  She was seen here in the emergency 

department 6 days ago and was diagnosed with kidney stones.  She has been taking

her Flomax and has been increasing her fluid intake, but continues to have pain 

in her right flank area.  She states that sitting and standing makes her pain 

worse, but bending over helps.  She has had a hysterectomy and denies any 

vaginal discharge.  Patient also has history of asthma.  She is currently on 

prednisone and albuterol.  She took a Percocet this morning at 3:30 and had no 

relief in her pain.


TRAVEL OUTSIDE OF THE [place] IN LAST 30 DAYS: No





- Related Data


Allergies/Adverse Reactions: 


                                        





Sulfa (Sulfonamide Antibiotics) Allergy (Verified 09/18/19 08:13)


   











Past Medical History





- Social History


Smoking Status: Never Smoker


Family History: Reviewed & Not Pertinent





- Past Medical History


Cardiac Medical History: 


   Denies: Hx Coronary Artery Disease, Hx Heart Attack, Hx Hypertension


Pulmonary Medical History: Reports: Hx Asthma - Hospitalized March & April 2015,

Hx Pneumonia - 3-4 yrs ago


   Denies: Hx Bronchitis, Hx COPD, Hx Intubation, Hx Tuberculosis


Neurological Medical History: Denies: Hx Cerebrovascular Accident, Hx Seizures


Renal/ Medical History: Reports: Hx Kidney Stones.  Denies: Hx Peritoneal 

Dialysis


Musculoskeletal Medical History: Denies Hx Arthritis


Past Surgical History: Reports: Hx Breast Surgery - left breast lump removed, Hx

Genitourinary Surgery - Bladder tuck, Hx Hysterectomy, Hx Orthopedic Surgery - 

RIGHT ANKLE ORIF, Hx Tubal Ligation.  Denies: Hx Pacemaker





- Immunizations


Hx Diphtheria, Pertussis, Tetanus Vaccination: Yes


Hx Pneumococcal Vaccination: 03/30/15





Review of Systems





- Review of Systems


Notes: 





REVIEW OF SYSTEMS:





CONSTITUTIONAL :    Denies recent illness.  Denies recent unintentional weight 

loss.  Denies fever,  chills, or sweats. 


EENT: Denies eye, ear, throat, or mouth pain, discharge, or symptoms.  Denies 

nasal or sinus congestion.


CARDIOVASCULAR:  Denies chest pain.


RESPIRATORY: Denies shortness of breath, cough, congestion, difficulty 

breathing, or wheezing. 


GASTROINTESTINAL: Denies nausea, vomiting, and diarrhea.  Denies abdominal pain.

 Denies constipation. 


GENITOURINARY: See HPI.


MUSCULOSKELETAL: See HPI.  Denies joint pain or swelling.


SKIN:   Denies rash, itchiness, or lesions


HEMATOLOGIC :   Denies easy bruising or bleeding.


LYMPHATIC:  Denies swollen, painful, enlarged glands.


NEUROLOGICAL: Denies no numbness or tingling denies weakness.  Denies headache. 

Denies altered mental status.  Denies alteration in speech.


PSYCHIATRIC:  Denies stress, anxiety, alteration in sleep patterns, or 

depression.





All other systems reviewed and negative.





Physical Exam





- Vital signs


Vitals: 


                                        











Temp Pulse Resp BP Pulse Ox


 


 97.6 F   105 H  18   144/87 H  97 


 


 09/18/19 08:08  09/18/19 08:08  09/18/19 08:08  09/18/19 08:08  09/18/19 08:08














- Notes


Notes: 





PHYSICAL EXAMINATION:





GENERAL: Appears well, healthy, well-nourished, no acute distress. 





HEAD:  Normocephalic, atraumatic.





EYES:  PERRL, conjunctiva normal, all extraocular movements intact, sclera blanca

cteric





ENT:  Moist mucous membranes. 





NECK: Supple, no noticeable swelling, redness, rash.  Normal range of motion.





LUNGS: Equal breath sounds bilaterally and clear to auscultation.  No wheezes 

rales or rhonchi.





CARDIOVASCULAR: S1-S2, regular rate, regular rhythm.  Radial pulses 2+, normal.





ABDOMEN: Normoactive bowel sounds.  Soft, mildly tender right upper abdomen,  no

guarding, no rebound tenderness, and no masses palpated.





EXTREMITIES: Normal strength and range of motion, no pitting or edema.  No 

cyanosis. 





NEUROLOGICAL: Moves all extremities upon command.  Strength 5/5 in all extrem

ities. 





PSYCH: Normal mood, normal affect.





SKIN: Warm, dry.  No rash, lesions, ulcerations noted.  Normal skin turgor.





BACK: Right CVA tenderness.





Course





- Re-evaluation


Re-evalutation: 





09/18/19 09:58


Patient's hematology shows a white blood cell count of 19,300.  No anemia noted.

 Her chemistries are unremarkable.  Urine shows a large amount of blood, 

consistent with her kidney stones found on ultrasound. CT of the of the chest 

shows groundglass nodules in the right middle lobe, consistent with her wheezing

from her asthma.  She also has a 4 mm stone that has traveled from the right 

ureter to now in the right ureteral vesicular junction.  She has mild 

hydroureteronephrosis.  She also has a 5 mm stone in her bladder, which she most

likely passed.  Other stones are nonobstructing and unchanged.


09/18/19 10:09


I discussed this case with Dr. Suggs and the patient will be started to cover

pneumonia.  She was also wheezing after her first DuoNeb treatment.  She will 

receive a second dose of DuoNeb.


09/18/19 10:57


Patient's lung sounds have improved.  I will give her a dose of Solu-Medrol and 

she will be sent home with prednisone.  She will be started on Ceftin ear and 

follow-up with her primary care provider in regards to the pneumonia.  I will 

add Toradol for pain relief.  She will follow-up with urology in regards to this

visit.  Very low suspicion for an infected kidney stone, as there is no 

leukocytes in her urine.  Follow-up precautions were given.  Verbal discharge 

instructions were given to the patient.  They verbalized understanding.  They 

are stable for discharge.











- Vital Signs


Vital signs: 


                                        











Temp Pulse Resp BP Pulse Ox


 


 97.6 F   105 H  18   144/87 H  97 


 


 09/18/19 08:08  09/18/19 08:08  09/18/19 08:08  09/18/19 08:08  09/18/19 08:08














- Laboratory


Result Diagrams: 


                                 09/18/19 09:00





                                 09/18/19 09:00


Laboratory results interpreted by me: 


                                        











  09/18/19 09/18/19 09/18/19





  08:22 09:00 09:00


 


WBC   19.3 H 


 


Lymph % (Auto)   11.1 L 


 


Absolute Neuts (auto)   15.1 H 


 


Absolute Eos (auto)   0.7 H 


 


Seg Neutrophils %   78.6 H 


 


Glucose    118 H


 


Urine Blood  LARGE H  














Discharge





- Discharge


Clinical Impression: 


 Calculus of proximal right ureter, Renal calculi





Pneumonia


Qualifiers:


 Pneumonia type: due to unspecified organism Laterality: right Lung location: 

unspecified part of lung Qualified Code(s): J18.9 - Pneumonia, unspecified 

organism





Condition: Stable


Disposition: HOME, SELF-CARE


Additional Instructions: 


You were seen today in the emergency department for right back pain.  You still 

have kidney stones.  One stone has passed into the bladder and another is moving

down to your bladder.  You can continue the pain medication you are on.  You are

also being placed on Toradol to help with pain relief.  You also are being 

treated for pneumonia.  Take your antibiotics as prescribed.  You are also being

placed on prednisone, steroid to help with your breathing.  Please take this 

medication with your current prednisone dosage.  Follow-up with urology and your

primary care provider.  If you have worsening symptoms, please return to the 

emergency department.


Prescriptions: 


Ketorolac Tromethamine [Toradol 10 mg Tablet] 10 mg PO Q6HP PRN #20 tablet


 PRN Reason: 


Prednisone [Deltasone 20 mg Tablet] 2 tab PO DAILY 5 Days #10 tablet


Cefdinir [Omnicef 300 mg Capsule] 1 cap PO BID 5 Days #10 capsule


Referrals: 


[facility] [Provider Group] - Follow up in 3-5 days


OFE ACEVEDO MD [Primary Care Provider] - Follow up in 3-5 days

## 2019-10-31 NOTE — ER DOCUMENT REPORT
ED General





- General


Chief Complaint: Breathing Difficulty


Stated Complaint: RESPIRATORY DISTRESS


Time Seen by Provider: 10/31/19 09:05


Primary Care Provider: 


OFE ACEVEDO MD [Primary Care Provider] - Follow up as needed


TRAVEL OUTSIDE OF THE [place] IN LAST 30 DAYS: No





- HPI


Notes: 





Patient presents with acute onset of asthma exacerbation.  Patient is a Dr. Acevedo patient.  She states she has not had any recent cough congestion fevers or

illnesses.  This was sudden onset that occurred this morning.  She has been 

taking her inhaled asthma medications.  She is on prednisone daily 20 mg for 

several years due to her severe asthma.  She is run out of her steroids for the 

last 3 days.  She was given a 125 Solu-Medrol in route and 3 duo nebs prior to 

arrival.





- Related Data


Allergies/Adverse Reactions: 


                                        





Sulfa (Sulfonamide Antibiotics) Allergy (Verified 10/31/19 07:12)


   











Past Medical History





- Social History


Smoking Status: Never Smoker


Family History: Reviewed & Not Pertinent


Patient has suicidal ideation: No


Patient has homicidal ideation: No





- Past Medical History


Cardiac Medical History: 


   Denies: Hx Coronary Artery Disease, Hx Heart Attack, Hx Hypertension


Pulmonary Medical History: Reports: Hx Asthma - Hospitalized March & April 2015,

Hx Pneumonia - 3-4 yrs ago


   Denies: Hx Bronchitis, Hx COPD, Hx Intubation, Hx Tuberculosis


Neurological Medical History: Denies: Hx Cerebrovascular Accident, Hx Seizures


Renal/ Medical History: Reports: Hx Kidney Stones.  Denies: Hx Peritoneal 

Dialysis


Musculoskeletal Medical History: Denies Hx Arthritis


Past Surgical History: Reports: Hx Breast Surgery - left breast lump removed, Hx

Genitourinary Surgery - Bladder tuck, Hx Hysterectomy, Hx Orthopedic Surgery - 

RIGHT ANKLE ORIF, Hx Tubal Ligation.  Denies: Hx Pacemaker





- Immunizations


Hx Diphtheria, Pertussis, Tetanus Vaccination: Yes


Hx Pneumococcal Vaccination: 03/30/15





Review of Systems





- Review of Systems


Constitutional: No symptoms reported


EENT: No symptoms reported


Cardiovascular: No symptoms reported


Respiratory: See HPI


Gastrointestinal: No symptoms reported


Genitourinary: No symptoms reported


Female Genitourinary: No symptoms reported


Musculoskeletal: No symptoms reported


Skin: No symptoms reported


Hematologic/Lymphatic: No symptoms reported


Neurological/Psychological: No symptoms reported





Physical Exam





- Vital signs


Vitals: 


                                        











Temp


 


 98.5 F 


 


 10/31/19 07:10














- General


General appearance: Appears well, Alert





- HEENT


Head: Normocephalic, Atraumatic


Conjunctiva: Normal


Pupils: PERRL





- Respiratory


Respiratory status: No respiratory distress, Other - No stridor no wheezing 

audibly.  Patient has diffuse mild wheezing with good air movement on 

auscultation with prolonged and expiratory breath phase





- Cardiovascular


Rhythm: Tachycardia


Heart sounds: Normal auscultation


Murmur: No





Course





- Re-evaluation


Re-evalutation: 





10/31/19 09:19


Overall well-appearing patient she is tachycardic but she had 3 nebulizer 

treatments.  Will provide another duo nebulizer treatment and reevaluate.  

Steroids of Manny been provided.  She is saturating at 94% on room air at this 

time.


10/31/19 11:41


Patient has adequate air movement but continues to have wheezing and delayed and

expiratory breath phase.  She is also tachycardic and saturating approximate 91%

do not feel safe for discharge will be admitted to the hospital for further 

breathing treatments and steroids to help with her asthma exacerbation.





- Vital Signs


Vital signs: 


                                        











Temp Pulse Resp BP Pulse Ox


 


 98.4 F   119 H  18   112/69   94 


 


 10/31/19 07:13  10/31/19 09:00  10/31/19 10:30  10/31/19 10:17  10/31/19 10:30














- Laboratory


Result Diagrams: 


                                 10/31/19 07:39





                                 10/31/19 07:39


Laboratory results interpreted by me: 


                                        











  10/31/19 10/31/19





  07:39 07:39


 


WBC  13.4 H 


 


MCHC  31.4 L 


 


RDW  14.5 H 


 


Chloride   108 H


 


Glucose   142 H














Discharge





- Discharge


Clinical Impression: 


 Respiratory distress





Acute asthma exacerbation


Qualifiers:


 Asthma severity: unspecified severity Asthma persistence: unspecified Qualified

Code(s): J45.901 - Unspecified asthma with (acute) exacerbation





Condition: Good


Disposition: ADMITTED AS INPATIENT


Admitting Provider: Vikash (Hospitalist)


Unit Admitted: Telemetry


Referrals: 


OFE ACEVEDO MD [Primary Care Provider] - Follow up as needed

## 2019-10-31 NOTE — PDOC H&P
History of Present Illness


Admission Date/PCP: 


10/31/2019





No primary care





Patient complains of: Shortness of breath, cough, wheeze


History of Present Illness: 


BRET LABOY is a 44 year old female with long-standing history of asthma and 

steroid dependency presents to the ER with an acute exacerbation of her asthma. 

Patient states she ran out of steroids several days ago and has not seen her 

primary care practitioner as she was fired from his service.  Patient does have 

Symbicort at home although she does not take it daily and had run out of 

albuterol recently.  She had no treatment prior to arrival other than home 

albuterol therapy and all active is aggravating factor.








Past Medical History


Cardiac Medical History: 


   Denies: Coronary Artery Disease, Myocardial Infarction, Hypertension


Pulmonary Medical History: Reports: Asthma - Hospitalized March & April 2015, 

Pneumonia - 3-4 yrs ago


   Denies: Bronchitis, Chronic Obstructive Pulmonary Disease (COPD), Intubation,

Tuberculosis


Neurological Medical History: 


   Denies: Seizures


Musculoskeltal Medical History: 


   Denies: Arthritis


Hematology: Reports: Anemia - Currently





Past Surgical History


Past Surgical History: Reports: Hysterectomy, Orthopedic Surgery - RIGHT ANKLE 

ORIF, Tubal Ligation


   Denies: Pacemaker





Social History


Information Source: Patient


Lives with: Family


Smoking Status: Never Smoker


Frequency of Alcohol Use: None


Hx Recreational Drug Use: No


Drugs: None


Hx Prescription Drug Abuse: No





- Advance Directive


Resuscitation Status: Full Code





Family History


Family History: Other - Patient is adopted and does not know her true family


Parental Family History Reviewed: Yes


Children Family History Reviewed: Yes


Sibling(s) Family History Reviewed.: Yes





Medication/Allergy


Home Medications: 








Albuterol Sulfate [Ventolin 0.083% Neb 2.5 mg/3 mL Ampul] 1 vial NEB Q4HP PRN 

04/01/19 


Prednisone [Deltasone 20 mg Tablet] 20 mg PO DAILY 04/01/19 


Tiotropium Bromide [Spiriva Handihaler 5 Cap/Kit (18 Mcg/Cap)] 1 cap IH Q12 

04/01/19 


Doxycycline Hyclate [Vibramycin 100 mg Tablet] 100 mg PO Q12 #14 tablet 04/05/19




Oxycodone HCl/Acetaminophen [Percocet 5-325 mg Tablet] 1 tab PO ASDIR PRN #15 

tablet 09/12/19 


Tamsulosin HCl [Flomax 0.4 mg Cap.sr] 0.4 mg PO DAILY #7 cap.sr.24h 09/12/19 


Cefdinir [Omnicef 300 mg Capsule] 1 cap PO BID 5 Days #10 capsule 09/18/19 


Ketorolac Tromethamine [Toradol 10 mg Tablet] 10 mg PO Q6HP PRN #20 tablet 

09/18/19 


Prednisone [Deltasone 20 mg Tablet] 2 tab PO DAILY 5 Days #10 tablet 09/18/19 








Allergies/Adverse Reactions: 


                                        





Sulfa (Sulfonamide Antibiotics) Allergy (Verified 10/31/19 07:12)


   











Review of Systems


Constitutional: ABSENT: chills, fever(s), headache(s), weight gain, weight loss


Eyes: ABSENT: visual disturbances


Ears: ABSENT: hearing changes


Cardiovascular: ABSENT: chest pain, dyspnea on exertion, edema, orthropnea, 

palpitations


Respiratory: PRESENT: cough, dyspnea, other - Wheeze.  ABSENT: hemoptysis


Gastrointestinal: ABSENT: abdominal pain, constipation, diarrhea, hematemesis, 

hematochezia, nausea, vomiting


Genitourinary: ABSENT: dysuria, hematuria


Musculoskeletal: ABSENT: joint swelling


Integumentary: ABSENT: rash, wounds


Neurological: ABSENT: abnormal gait, abnormal speech, confusion, dizziness, 

focal weakness, syncope


Psychiatric: ABSENT: anxiety, depression, homidical ideation, suicidal ideation


Endocrine: ABSENT: cold intolerance, heat intolerance, polydipsia, polyuria


Hematologic/Lymphatic: ABSENT: easy bleeding, easy bruising





Physical Exam


Vital Signs: 


                                        











Temp Pulse Resp BP Pulse Ox


 


 98.4 F   119 H  17   133/76 H  96 


 


 10/31/19 07:13  10/31/19 09:00  10/31/19 12:01  10/31/19 12:01  10/31/19 12:01








                                 Intake & Output











 10/30/19 10/31/19 11/01/19





 06:59 06:59 06:59


 


Weight   262 kg











General appearance: PRESENT: no acute distress, well-developed, well-nourished


Head exam: PRESENT: atraumatic, normocephalic


Eye exam: PRESENT: conjunctiva pink, EOMI, PERRLA.  ABSENT: scleral icterus


Ear exam: PRESENT: normal external ear exam


Mouth exam: PRESENT: moist, tongue midline


Neck exam: ABSENT: carotid bruit, JVD, lymphadenopathy, thyromegaly


Respiratory exam: PRESENT: decreased breath sounds, symmetrical, tachypnea, 

unlabored, wheezes.  ABSENT: rales, rhonchi


Cardiovascular exam: PRESENT: RRR, tachycardia.  ABSENT: diastolic murmur, rubs,

systolic murmur


Pulses: PRESENT: normal dorsalis pedis pul


Vascular exam: PRESENT: normal capillary refill


GI/Abdominal exam: PRESENT: normal bowel sounds, soft.  ABSENT: distended, 

guarding, mass, organolmegaly, rebound, tenderness


Rectal exam: PRESENT: deferred


Extremities exam: PRESENT: full ROM.  ABSENT: calf tenderness, clubbing, pedal 

edema


Neurological exam: PRESENT: alert, awake, oriented to person, oriented to place,

oriented to time, oriented to situation, CN II-XII grossly intact.  ABSENT: 

motor sensory deficit


Psychiatric exam: PRESENT: appropriate affect, normal mood.  ABSENT: homicidal 

ideation, suicidal ideation


Skin exam: PRESENT: dry, intact, warm.  ABSENT: cyanosis, rash





Results


Laboratory Results: 


                                        





                                 10/31/19 07:39 





                                 10/31/19 07:39 





                                        











  10/31/19 10/31/19 10/31/19





  07:39 07:39 07:39


 


WBC   13.4 H 


 


RBC   4.29 


 


Hgb   12.1 


 


Hct   38.5 


 


MCV   90 


 


MCH   28.2 


 


MCHC   31.4 L 


 


RDW   14.5 H 


 


Plt Count   332 


 


VBG pH  7.36  


 


VBG pCO2  44.2  


 


VBG HCO3  24.5  


 


VBG Base Excess  -1.1  


 


Sodium    140.4


 


Potassium    3.8


 


Chloride    108 H


 


Carbon Dioxide    24


 


Anion Gap    8


 


BUN    10


 


Creatinine    0.77


 


Est GFR ( Amer)    > 60


 


Glucose    142 H


 


Calcium    8.9


 


Total Bilirubin    0.5


 


AST    15


 


Alkaline Phosphatase    103


 


Total Protein    7.0


 


Albumin    3.7











Impressions: 


                                        





Chest X-Ray  10/31/19 00:00


IMPRESSION:  No acute abnormality of the lungs.  No focal airspace opacity.


 














Assessment and Plan





- Diagnosis


(1) Chronic asthma with acute exacerbation


Is this a current diagnosis for this admission?: Yes   


Plan: 


10/31/2019-admit to [facility].  Albuterol nebs every 4 with ipratropium bromide twice

daily.  Pulmicort inhaled 0.5 mg twice daily.  Singulair 10 mg p.o. daily.  

Patient reports she takes 20 mg of prednisone which is been taking for her 

disease for 8 years.  She would like to be weaned off of steroids if possible.  

Given the chronicity and recurrent exacerbations I am placing patient on 

theophylline 300 mg p.o. daily.  Solu-Medrol 40 mill grams IV 3 times daily.  We

will continue to follow make adjustments based on patient needs.








(2) Hyperglycemia


Is this a current diagnosis for this admission?: Yes   


Plan: 


10/31/2019-A1c.  Carbohydrate controlled diet with ACE and at bedtime sliding 

scale insulin.  This could be secondary to chronic steroid use will make change 

Medicare based on needs.








(3) Morbid obesity


Is this a current diagnosis for this admission?: Yes   


Plan: 


10/31/2019-educated on benefits of dietary modifications.  Will obtain a lipid 

panel and treat as appropriate








(4) Tachycardia


Is this a current diagnosis for this admission?: Yes   


Plan: 


10/31/2019-unknown etiology.  Will obtain TSH T4.  Place patient on Coreg 12.5 

mg p.o. twice daily and titrate to effect to keep heart rate less than 100.








(5) Leukocytosis


Is this a current diagnosis for this admission?: Yes   


Plan: 


10/31/2019-most likely reactional to steroid use.  Will follow








- Time


Time Spent with patient: 35 or more minutes

## 2019-10-31 NOTE — RADIOLOGY REPORT (SQ)
EXAM DESCRIPTION:  CHEST 2 VIEWS



COMPLETED DATE/TIME:  10/31/2019 7:42 am



REASON FOR STUDY:  inspir/expir wheezing,   difficulty breathing



COMPARISON:  4/3/2019



EXAM PARAMETERS:  NUMBER OF VIEWS: two views

TECHNIQUE: Digital Frontal and Lateral radiographic views of the chest acquired.

RADIATION DOSE: NA

LIMITATIONS: none



FINDINGS:  LUNGS AND PLEURA: No opacities, masses or pneumothorax. No pleural effusion.

MEDIASTINUM AND HILAR STRUCTURES: No masses or contour abnormalities.

HEART AND VASCULAR STRUCTURES: Heart normal size.  No evidence for failure.

BONES: No acute findings.

HARDWARE: None in the chest.

OTHER: No other significant finding.



IMPRESSION:  No acute abnormality of the lungs.  No focal airspace opacity.



TECHNICAL DOCUMENTATION:  JOB ID:  [id number]

 2011 [employer]- All Rights Reserved



Reading location - IP/workstation name: [email or web address]

## 2019-11-01 NOTE — PDOC DISCHARGE SUMMARY
Impression





- Admit/DC Date/PCP


Admission Date/Primary Care Provider: 


  10/31/19 12:58





  OFE ACEVEDO MD





Discharge Date: 11/01/19





- Discharge Diagnosis


(1) Chronic asthma with acute exacerbation


Is this a current diagnosis for this admission?: Yes   





(2) Hyperglycemia


Is this a current diagnosis for this admission?: Yes   





(3) Morbid obesity


Is this a current diagnosis for this admission?: Yes   





(4) Tachycardia


Is this a current diagnosis for this admission?: Yes   





(5) Leukocytosis


Is this a current diagnosis for this admission?: Yes   





(6) Dyslipidemia


Is this a current diagnosis for this admission?: Yes   





(7) Hyperthyroidism


Is this a current diagnosis for this admission?: Yes   





(8) Type 2 diabetes mellitus with obesity


Is this a current diagnosis for this admission?: Yes   





- Additional Information


Resuscitation Status: Full Code


Discharge Activity: Activity As Tolerated


Referrals: 


OFE ACEVEDO MD [Primary Care Provider] - Follow up as needed


Prescriptions: 


Carvedilol [Coreg 12.5 mg Tablet] 12.5 mg PO Q12 #60 tablet


Carvedilol [Coreg 12.5 mg Tablet] 12.5 mg PO Q12 #60 tablet


Prednisone [Deltasone 20 mg Tablet] 20 mg PO DAILY #30


Ipratropium/Albuterol Sulfate [Duoneb 3 ml Ampul] 3 ml NEB BID #90 vial.neb


Atorvastatin Calcium [Lipitor 40 mg Tablet] 40 mg PO QHS #30 tablet


Metformin HCl 500 mg PO BID #60 tablet


Methimazole [Tapazole 5 mg Tablet] 5 mg PO DAILY #30 tablet


Theophylline Anhydrous [Constantino-Dur 300 mg Tab.sr] 300 mg PO DAILY #30 tab.sr.12h


Home Medications: 








Albuterol Sulfate [Proair HFA Inhalation Aerosol 8.5 gm MDI] 2 puff IH ASDIR PRN

10/31/19 


Atorvastatin Calcium [Lipitor 40 mg Tablet] 40 mg PO QHS #30 tablet 11/01/19 


Carvedilol [Coreg 12.5 mg Tablet] 12.5 mg PO Q12 #60 tablet 11/01/19 


Carvedilol [Coreg 12.5 mg Tablet] 12.5 mg PO Q12 #60 tablet 11/01/19 


Ipratropium/Albuterol Sulfate [Duoneb 3 ml Ampul] 3 ml NEB BID #90 vial.neb 

11/01/19 


Metformin HCl 500 mg PO BID #60 tablet 11/01/19 


Methimazole [Tapazole 5 mg Tablet] 5 mg PO DAILY #30 tablet 11/01/19 


Prednisone [Deltasone 20 mg Tablet] 20 mg PO DAILY #30 11/01/19 


Theophylline Anhydrous [Constantino-Dur 300 mg Tab.sr] 300 mg PO DAILY #30 tab.sr.12h 

11/01/19 











History of Present Illiness


History of Present Illness: 


BRET LABOY is a 44 year old female with long-standing history of asthma and 

steroid dependency presents to the ER with an acute exacerbation of her asthma. 

Patient states she ran out of steroids several days ago and has not seen her 

primary care practitioner as she was fired from his service.  Patient does have 

Symbicort at home although she does not take it daily and had run out of 

albuterol recently.  She had no treatment prior to arrival other than home 

albuterol therapy and all active is aggravating factor.








Hospital Course


Hospital Course: 


Patient was in the ER with an acute exacerbation of chronic asthma.  Patient was

placed on IMCU given duo nebs, theophylline, IV steroids.  Patient showed 

gradual improvement.  Patient is able speak in full sentences and has good room 

air saturations.  Further work-up of patient found she has hyperthyroidism for 

which I placed on methimazole 5 mill grams p.o. daily, Coreg 12.5 mg p.o. twice 

daily.  Patient also found to have type 2 diabetes mellitus for which I placed 

her on metformin 500 mg p.o. twice daily.  Patient had dyslipidemia with a LDL 

of 219 which I gave her Lipitor 40 mg p.o. daily.  I will educate patient on 

dietary recommendations have her follow-up in clinic in 1 week.





Physical Exam


Vital Signs: 


                                        











Temp Pulse Resp BP Pulse Ox


 


 97.5 F   101 H  18   118/61   99 


 


 11/01/19 07:56  11/01/19 07:56  11/01/19 07:56  11/01/19 07:56  11/01/19 07:56








                                 Intake & Output











 10/31/19 11/01/19 11/02/19





 06:59 06:59 06:59


 


Intake Total  240 


 


Output Total  200 


 


Balance  40 


 


Weight  120.4 kg 











General appearance: PRESENT: no acute distress, well-developed, well-nourished


Head exam: PRESENT: atraumatic, normocephalic


Eye exam: PRESENT: conjunctiva pink, EOMI, PERRLA.  ABSENT: scleral icterus


Ear exam: PRESENT: normal external ear exam


Mouth exam: PRESENT: moist, tongue midline


Neck exam: ABSENT: carotid bruit, JVD, lymphadenopathy, thyromegaly


Respiratory exam: PRESENT: clear to auscultation sari.  ABSENT: rales, rhonchi, 

wheezes


Cardiovascular exam: PRESENT: RRR.  ABSENT: diastolic murmur, rubs, systolic 

murmur


Pulses: PRESENT: normal dorsalis pedis pul


Vascular exam: PRESENT: normal capillary refill


GI/Abdominal exam: PRESENT: normal bowel sounds, soft.  ABSENT: distended, 

guarding, mass, organolmegaly, rebound, tenderness


Rectal exam: PRESENT: deferred


Extremities exam: PRESENT: full ROM.  ABSENT: calf tenderness, clubbing, pedal 

edema


Neurological exam: PRESENT: alert, awake, oriented to person, oriented to place,

oriented to time, oriented to situation, CN II-XII grossly intact.  ABSENT: 

motor sensory deficit


Psychiatric exam: PRESENT: appropriate affect, normal mood.  ABSENT: homicidal 

ideation, suicidal ideation


Skin exam: PRESENT: dry, intact, warm.  ABSENT: cyanosis, rash





Results


Laboratory Results: 


                                        











WBC  23.9 10^3/uL (4.0-10.5)  H  11/01/19  05:10    


 


RBC  4.26 10^6/uL (3.72-5.28)   11/01/19  05:10    


 


Hgb  12.3 g/dL (12.0-15.5)   11/01/19  05:10    


 


Hct  38.3 % (36.0-47.0)   11/01/19  05:10    


 


MCV  90 fl (80-97)   11/01/19  05:10    


 


MCH  28.8 pg (27.0-33.4)   11/01/19  05:10    


 


MCHC  32.0 g/dL (32.0-36.0)   11/01/19  05:10    


 


RDW  14.3 % (11.5-14.0)  H  11/01/19  05:10    


 


Plt Count  366 10^3/uL (150-450)   11/01/19  05:10    


 


VBG pH  7.36  (7.30-7.42)   10/31/19  07:39    


 


VBG pCO2  44.2 mmHg (35-63)   10/31/19  07:39    


 


VBG HCO3  24.5 mmol/L (20-32)   10/31/19  07:39    


 


VBG Base Excess  -1.1 mmol/L  10/31/19  07:39    


 


Sodium  136.5 mmol/L (137-145)  L  11/01/19  05:10    


 


Potassium  4.6 mmol/L (3.6-5.0)   11/01/19  05:10    


 


Chloride  108 mmol/L ()  H  11/01/19  05:10    


 


Carbon Dioxide  19 mmol/L (22-30)  L  11/01/19  05:10    


 


Anion Gap  10  (5-19)   11/01/19  05:10    


 


BUN  13 mg/dL (7-20)   11/01/19  05:10    


 


Creatinine  0.69 mg/dL (0.52-1.25)   11/01/19  05:10    


 


Est GFR ( Amer)  > 60  (>60)   11/01/19  05:10    


 


Est GFR (MDRD) Non-Af  > 60  (>60)   11/01/19  05:10    


 


Glucose  205 mg/dL ()  H  11/01/19  05:10    


 


POC Glucose  185 mg/dL ()  H  11/01/19  07:55    


 


Hemoglobin A1c %  7.2 % (4.7-6.0)  H  11/01/19  05:10    


 


Calcium  9.7 mg/dL (8.4-10.2)   11/01/19  05:10    


 


Phosphorus  3.4 mg/dL (2.5-4.5)   11/01/19  05:10    


 


Magnesium  2.4 mg/dL (1.6-2.3)  H  11/01/19  05:10    


 


Total Bilirubin  0.5 mg/dL (0.2-1.3)   10/31/19  07:39    


 


Direct Bilirubin  0.1 mg/dL (0.0-0.4)   10/31/19  07:39    


 


Neonat Total Bilirubin  Not Reportable   10/31/19  07:39    


 


Neonat Direct Bilirubin  Not Reportable   10/31/19  07:39    


 


Neonat Indirect Bili  Not Reportable   10/31/19  07:39    


 


AST  15 U/L (14-36)   10/31/19  07:39    


 


ALT  20 U/L (<35)   10/31/19  07:39    


 


Alkaline Phosphatase  103 U/L ()   10/31/19  07:39    


 


Total Protein  7.0 g/dL (6.3-8.2)   10/31/19  07:39    


 


Albumin  3.7 g/dL (3.5-5.0)   10/31/19  07:39    


 


Triglycerides  67 mg/dL (<150)   11/01/19  05:10    


 


Cholesterol  298.63 mg/dL (0-200)  H  11/01/19  05:10    


 


LDL Cholesterol Direct  219 mg/dL (<100)  H  11/01/19  05:10    


 


VLDL Cholesterol  13.0 mg/dL (10-31)   11/01/19  05:10    


 


HDL Cholesterol  65 mg/dL (>40)   11/01/19  05:10    


 


TSH  0.39 uIU/mL (0.47-4.68)  L  11/01/19  05:10    


 


Free T4  1.02 ng/dL (0.78-2.19)   11/01/19  05:10    











Impressions: 


                                        





Chest X-Ray  10/31/19 00:00


IMPRESSION:  No acute abnormality of the lungs.  No focal airspace opacity.


 














Plan


Time Spent: Greater than 30 Minutes





Stroke


Is this a Stroke Patient?: No





Acute Heart Failure





- **


Is this a Heart Failure Patient?: No

## 2020-01-15 NOTE — RADIOLOGY REPORT (SQ)
EXAM DESCRIPTION:



[facility]: XR CHEST 2 VIEWS 

Views:  2 



CLINICAL HISTORY:

44 years Female, chest pain, cough, wheezing



COMPARISON:

10/31/2019



FINDINGS: 

There is no definite acute infiltrate, although there is a very

subtle slightly increased density in the right upper lung field

is new since the prior exam. No pneumothorax or pleural effusion.

  

Cardiomediastinal silhouette is within normal limits. 

Bony structures are unremarkable for age. 



IMPRESSION:



1.  No definite infiltrate. A subtle density in the right upper

lobe could be a mild/early focal pneumonia, or an area of mild

subsegmental atelectasis. Please correlate with clinical

findings.

## 2020-01-15 NOTE — EKG REPORT
SEVERITY:- OTHERWISE NORMAL ECG -

SINUS TACHYCARDIA

:

Confirmed by: Mireille Cuello MD [id number]-2020 05:37:12

## 2020-01-15 NOTE — ER DOCUMENT REPORT
ED Medical Screen (RME)





- General


Chief Complaint: Shortness Of Breath


Stated Complaint: SHORTNESS OF BREATH


Time Seen by Provider: 01/15/20 00:09


Notes: 





Patient is a 44-year-old female with a history of hyperthyroidism, high 

cholesterol, diabetes, hypertension, asthma who presents to the emergency 

department with a chief complaint of shortness of breath.  Patient reports she 

has had some sinus drainage and shortness of breath with wheezing that started 

this morning and gradually got worse throughout the day.  Patient reports she 

has used her albuterol rescue inhaler without much relief.  Patient reports she 

does not smoke.  Patient reports low-grade fever and chills.  Patient also 

reports having chest pain.


TRAVEL OUTSIDE OF THE [place] IN LAST 30 DAYS: No





- Related Data


Allergies/Adverse Reactions: 


                                        





Sulfa (Sulfonamide Antibiotics) Allergy (Verified 01/14/20 23:58)


   








Home Medications: ALBUTEROL.  THYROID.  HEART RATE





Past Medical History





- Social History


Family history: Reviewed & Not Pertinent





- Past Medical History


Cardiac Medical History: 


   Denies: Hx Coronary Artery Disease, Hx Heart Attack, Hx Hypertension


Pulmonary Medical History: Reports: Hx Asthma - Hospitalized March & April 2015,

Hx Pneumonia - 3-4 yrs ago


   Denies: Hx Bronchitis, Hx COPD, Hx Intubation, Hx Tuberculosis


Neurological Medical History: Denies: Hx Cerebrovascular Accident, Hx Seizures


Renal/ Medical History: Reports: Hx Kidney Stones.  Denies: Hx Peritoneal 

Dialysis


Musculoskeltal Medical History: Denies Hx Arthritis


Past Surgical History: Reports: Hx Breast Surgery - left breast lump removed, Hx

Genitourinary Surgery - Bladder tuck, Hx Hysterectomy, Hx Orthopedic Surgery - 

RIGHT ANKLE ORIF, Hx Tubal Ligation.  Denies: Hx Pacemaker





- Immunizations


Hx Diphtheria, Pertussis, Tetanus Vaccination: Yes





Physical Exam





- Vital signs


Vitals: 





                                        











Temp Pulse Resp BP Pulse Ox


 


 100.5 F H  113 H  22 H  141/90 H  93 


 


 01/14/20 22:34  01/14/20 22:34  01/14/20 22:34  01/14/20 22:34  01/14/20 22:34














- Respiratory


Notes: 





Diffuse expiratory wheezing noted throughout.





Course





- Re-evaluation


Re-evalutation: 





01/15/20 00:12


Patient has had a low-grade temp of 100.5 as well as a slight tachycardia.  We 

will give a DuoNeb, check basic labs and obtain a chest x-ray to rule out 

pneumonia.





I have greeted and performed a rapid initial assessment of this patient.  A 

comprehensive ED assessment and evaluation of the patient, analysis of test 

results and completion of the medical decision making process will be conducted 

by additional ED providers.





- Vital Signs


Vital signs: 





                                        











Temp Pulse Resp BP Pulse Ox


 


 100.5 F H  113 H  22 H  141/90 H  93 


 


 01/14/20 22:34  01/14/20 22:34  01/14/20 22:34  01/14/20 22:34  01/14/20 22:34

## 2020-03-22 NOTE — ER DOCUMENT REPORT
ED Respiratory Problem





- General


Chief Complaint: Asthma Exacerbation


Stated Complaint: ASTHMA


Time Seen by Provider: 03/22/20 23:37


Mode of Arrival: Ambulatory


Information source: Patient


Notes: 





Patient is a 44-year-old female with history of asthma presenting in an acute 

asthma exacerbation.  The nursing staff tells me that patient's O2 sats were in 

the low 80s when she arrived at the front door.  At the time of my initial 

evaluation she is on 6 L of O2 via nasal cannula and she has a DuoNeb going.  

Patient is alert, oriented, her oxygen saturation is currently 95%, she is 

speaking in complete sentences.  She denies any fever or recent coughing.  She 

has not had any recent travel.  She has never been intubated for her asthma.





TRAVEL OUTSIDE OF THE [place] IN LAST 30 DAYS: No





- Related Data


Allergies/Adverse Reactions: 


                                        





Sulfa (Sulfonamide Antibiotics) Allergy (Verified 01/14/20 23:58)


   











Past Medical History





- General


Information source: Patient





- Social History


Smoking Status: Never Smoker


Frequency of alcohol use: None


Drug Abuse: None


Family History: Other - Patient is adopted and does not know her true family





- Past Medical History


Cardiac Medical History: 


   Denies: Hx Coronary Artery Disease, Hx Heart Attack, Hx Hypertension


Pulmonary Medical History: Reports: Hx Asthma - Hospitalized March & April 2015,

Hx Pneumonia - 3-4 yrs ago


   Denies: Hx Bronchitis, Hx COPD, Hx Intubation, Hx Tuberculosis


Neurological Medical History: Denies: Hx Cerebrovascular Accident, Hx Seizures


Renal/ Medical History: Reports: Hx Kidney Stones.  Denies: Hx Peritoneal 

Dialysis


Musculoskeletal Medical History: Denies Hx Arthritis


Past Surgical History: Reports: Hx Breast Surgery - left breast lump removed, Hx

Genitourinary Surgery - Bladder tuck, Hx Hysterectomy, Hx Orthopedic Surgery - 

RIGHT ANKLE ORIF, Hx Tubal Ligation.  Denies: Hx Pacemaker





- Immunizations


Hx Diphtheria, Pertussis, Tetanus Vaccination: Yes


Hx Pneumococcal Vaccination: 03/30/15





Review of Systems





- Review of Systems


Constitutional: denies: Fever


EENT: No symptoms reported


Cardiovascular: No symptoms reported


Respiratory: Short of breath, Wheezing


Gastrointestinal: No symptoms reported


Genitourinary: No symptoms reported


Female Genitourinary: No symptoms reported


Musculoskeletal: No symptoms reported


Skin: No symptoms reported


Hematologic/Lymphatic: No symptoms reported


Neurological/Psychological: No symptoms reported





Physical Exam





- Vital signs


Vitals: 


                                        











Temp


 


 97.8 F 


 


 03/22/20 23:26














- Notes


Notes: 





PHYSICAL EXAMINATION:





GENERAL: Well-nourished and in moderate distress.





HEAD: Atraumatic, normocephalic.





EYES: Pupils equal round and reactive to light, extraocular movements intact, 

conjunctiva are normal.





ENT: Nares patent, oropharynx clear without exudates.  Moist mucous membranes.





NECK: Normal range of motion, supple without lymphadenopathy





LUNGS: Inspiratory and expiratory wheezes noted bilaterally.  Increased work of 

breathing.





HEART: Regular rate and rhythm without murmurs





ABDOMEN: Soft, nontender, nondistended abdomen.  No guarding, no rebound.  No 

masses appreciated.





Female : deferred





Musculoskeletal: Normal range of motion, no pitting or edema.  No cyanosis.





NEUROLOGICAL: Cranial nerves grossly intact.  Normal speech.  Normal sensory, 

motor exams





PSYCH: Normal mood, normal affect.





SKIN: Warm, Dry, normal turgor, no rashes or lesions noted.





Course





- Re-evaluation


Re-evalutation: 





03/22/20 23:49


Patient is on nasal cannula oxygen and has a DuoNeb going.  She has inspiratory 

and expiratory wheezes throughout.  Additional orders for IV magnesium 

initiated.





On reevaluation patient has improved however she still has significant 

expiratory wheezes.  Nursing staff ambulated the patient and she dropped to 87% 

on room air.  Patient does not wear oxygen at home.  She is still tachypneic.  

Will consult for admission.





03/23/20 02:30


Patient accepted for admission by Dr. Weiland.





- Vital Signs


Vital signs: 


                                        











Temp Pulse Resp BP Pulse Ox


 


 97.8 F      20   141/86 H  93 


 


 03/22/20 23:26     03/23/20 02:31  03/23/20 02:31  03/23/20 02:31














Discharge





- Discharge


Clinical Impression: 


Acute asthma exacerbation


Qualifiers:


 Asthma severity: severe Asthma persistence: persistent Qualified Code(s): J45.5

1 - Severe persistent asthma with (acute) exacerbation





Condition: Fair


Disposition: ADMITTED AS INPATIENT


Admitting Provider: Weiland (Hospitalist)


Unit Admitted: Medical Floor

## 2020-03-23 NOTE — PDOC H&P
History of Present Illness


Admission Date/PCP: 


03/23/2020  02:25


No local PCP


Patient complains of: Dyspnea


History of Present Illness: 


BRET LABOY is a 44 year old female who presented to the emergency room with 

a 1 day history of dyspnea.  She admits initially developing mild tightness in 

her breathing causing dyspnea on the late evening of 3/21/2020.  Despite use of 

her nebulized regimen and vigilant taking of her medications, she progressively 

worsened and her response to her home nebulizer treatments became negligible 

thus causing her to present to the hospital for assistance with her now severe 

dyspnea.  She admits the sudden onset of severe dyspnea associated with wheezing

and air hunger and accompanied by the requirement for use of accessory muscles 

of respiration in order to breathe.  She denies other associated or accompanying

signs and symptoms.  She admits numerous prior similar episodes due to her 

chronic severe extrinsic asthma.  She has not identified any aggravating or 

ameliorating factors for her asthma.  In the emergency room she was found to be 

in acute hypoxic respiratory failure and was treated with supplemental oxygen 

via nasal cannula and given intravenous Solu-Medrol as well as several nebulizer

therapy treatments.  She responded in part to the treatment with an improvement 

however she was unable to tolerate any exertion without supplemental oxygen 

which she does not have available at home.  Patient was subsequently admitted to

the hospital for further evaluation and treatment.





Past Medical History


Cardiac Medical History: Reports: Hyperlipidema - A familial hyperlipidemia 

variant is strongly suspected, Hypertension


   Denies: Atrial Fibrillation, Congestive Heart Failure, Coronary Artery 

Disease, DVT, Myocardial Infarction, Pulmonary Embolism


Pulmonary Medical History: Reports: Asthma - Severe chronic extrinsic asthma, 

Pneumonia, Respiratory Failure


   Denies: Bronchitis, Chronic Obstructive Pulmonary Disease (COPD), Intubation,

Tuberculosis


EENT Medical History: 


   Denies: Cataracts, Ears - Hearing aids


Neurological Medical History: 


   Denies: Hemorrhagic CVA, Ischemic CVA, Seizures


Endocrine Medical History: Reports: Diabetes Mellitus Type 2 - Borderline 

diabetes mellitus type 2 per patient, Hyperthyroidism, Obesity


   Denies: Diabetes Mellitus Type 1, Hypothyroidism


Renal/ Medical History: Reports: Nephrolithiasis


   Denies: Chronic Kidney Disease


Malignancy Medical History: Reports: None


GI Medical History: Reports: Gastroesophageal Reflux Disease


   Denies: Cirrhosis, Crohn's Disease, Hepatitis, Hiatal Hernia, Peptic Ulcer 

Disease, Ulcerative Colitis


Musculoskeltal Medical History: 


   Denies: Arthritis


Skin Medical History: 


   Denies: Eczema, Psoriasis


Psychiatric Medical History: 


   Denies: Alcohol Dependency, Substance Abuse, Tobacco Dependency


Traumatic Medical History: Reports: None


Hematology: Reports: Anemia


   Denies: Bleeding Tendencies


Infectious Medical History: Reports: None





Past Surgical History


Past Surgical History: Reports: Hysterectomy, Orthopedic Surgery - Right ankle: 

open reduction with internal fixation, Tubal Ligation





Social History


Information Source: Patient


Lives with: Family


Smoking Status: Never Smoker


Electronic Cigarette use?: No


Frequency of Alcohol Use: None


Hx Recreational Drug Use: No


Drugs: None


Hx Prescription Drug Abuse: No





- Advance Directive


Resuscitation Status: Full Code


Surrogate healthcare decision maker:: 


Danna Duckworth





Family History


Family History: None


Family History: 


The patient was adopted as an infant and is not aware of any of her biologic 

family medical history


Parental Family History Reviewed: No


Children Family History Reviewed: No


Sibling(s) Family History Reviewed.: No





Medication/Allergy


Home Medications: 








Albuterol Sulfate [Proair HFA Inhalation Aerosol 8.5 gm MDI] 2 puff IH ASDIR PRN

10/31/19 


Carvedilol [Coreg 12.5 mg Tablet] 12.5 mg PO Q12 #60 tablet 11/01/19 


Methimazole [Tapazole 5 mg Tablet] 5 mg PO DAILY #30 tablet 11/01/19 


Prednisone [Deltasone 20 mg Tablet] 20 mg PO DAILY #30 11/01/19 


Atorvastatin Calcium [Lipitor 40 mg Tablet] 20 mg PO QHS 03/23/20 


Glipizide [Glocotrol 5 Mg Tablet] 5 mg PO DAILY 03/23/20 


Ipratropium/Albuterol Sulfate [Duoneb 3 ml Ampul] 3 ml NEB BIDP PRN 03/23/20 


Theophylline Anhydrous [Constantino-Dur 300 mg Tab.sr] 300 mg PO BID 03/23/20 








Allergies/Adverse Reactions: 


                                        





Sulfa (Sulfonamide Antibiotics) Allergy (Verified 01/14/20 23:58)


   











Review of Systems


Constitutional: ABSENT: chills, fever(s)


Eyes: ABSENT: visual disturbances, other - Eye pain


Ears: ABSENT: hearing changes, other - Ear pain


Nose, Mouth, and Throat: ABSENT: headache(s), sore throat


Cardiovascular: PRESENT: dyspnea on exertion.  ABSENT: chest pain, edema, 

orthropnea, palpitations


Respiratory: PRESENT: as per HPI, dyspnea - Severe, other - Severe wheezing.  

ABSENT: cough


Gastrointestinal: ABSENT: abdominal pain, constipation, diarrhea, nausea, 

vomiting


Genitourinary: ABSENT: dysuria, hematuria


Musculoskeletal: ABSENT: back pain, joint swelling, muscle weakness


Integumentary: ABSENT: pruritus, rash


Neurological: ABSENT: confusion, convulsions, focal weakness, memory loss, 

syncope


Psychiatric: ABSENT: anxiety, depression


Endocrine: ABSENT: cold intolerance, heat intolerance, polydipsia, polyphagia, 

polyuria


Hematologic/Lymphatic: ABSENT: easy bleeding, easy bruising


Allergic/Immunologic: PRESENT: seasonal rhinorrhea





Physical Exam


Vital Signs: 


                                        











Temp Pulse Resp BP Pulse Ox


 


 97.8 F      20   124/74   94 


 


 03/22/20 23:26     03/23/20 01:30  03/23/20 01:30  03/23/20 01:30








                                 Intake & Output











 03/21/20 03/22/20 03/23/20





 23:59 23:59 23:59


 


Intake Total   150


 


Balance   150


 


Weight  122.5 kg 











General appearance: PRESENT: no acute distress, cooperative, morbidly obese, 

other - On supplemental oxygen via nasal cannula while at rest at the time my 

evaluation


Head exam: PRESENT: atraumatic, normocephalic


Eye exam: PRESENT: conjunctiva pink, other - Xanthelasmas of the medial superior

eyelid bilaterally are noted.  ABSENT: conjunctival injection, scleral icterus


Ear exam: PRESENT: normal external ear exam.  ABSENT: bleeding, drainage


Mouth exam: PRESENT: neck supple.  ABSENT: dry mucosa


Neck exam: ABSENT: thyromegaly, tracheal deviation


Respiratory exam: PRESENT: prolonged expiratory phas - Prolonged expiratory 

phase noted throughout all fields, symmetrical, tachypnea - Tachypnea noted, 

wheezes - Expiratory wheezes noted throughout all fields


Cardiovascular exam: PRESENT: RRR.  ABSENT: clicks, gallop, rubs


Pulses: PRESENT: normal radial pulses, normal dorsalis pedis pul


Vascular exam: PRESENT: normal capillary refill.  ABSENT: pallor


GI/Abdominal exam: PRESENT: normal bowel sounds, soft.  ABSENT: tenderness


Rectal exam: PRESENT: deferred


Extremities exam: ABSENT: joint swelling, pedal edema


Musculoskeletal exam: ABSENT: deformity, dislocation


Neurological exam: PRESENT: alert, oriented to person, oriented to place, 

oriented to time, oriented to situation, CN II-XII grossly intact.  ABSENT: 

motor sensory deficit


Psychiatric exam: PRESENT: anxious, normal mood


Skin exam: PRESENT: dry, intact, warm, other - Xanthelasmas as noted above..  

ABSENT: jaundice, rash, urticaria





Results


Impressions: 


                                        





Chest X-Ray  03/22/20 23:33


IMPRESSION: 


 


No acute cardiopulmonary disease.


 














Assessment and Plan





- Diagnosis


(1) Chronic asthma with acute exacerbation


Qualifiers: 


   Asthma severity: severe   Asthma persistence: persistent   Qualified Code(s):

J45.51 - Severe persistent asthma with (acute) exacerbation   


Is this a current diagnosis for this admission?: Yes   





(2) Acute respiratory failure with hypoxia


Is this a current diagnosis for this admission?: Yes   





(3) Hyperthyroidism


Is this a current diagnosis for this admission?: Yes   





(4) Hypertension


Qualifiers: 


   Hypertension type: unspecified   Qualified Code(s): I10 - Essential (primary)

hypertension   


Is this a current diagnosis for this admission?: Yes   





(5) Familial hyperlipidemia


Is this a current diagnosis for this admission?: Yes   





(6) Type 2 diabetes mellitus with obesity


Is this a current diagnosis for this admission?: Yes   





(7) Morbid obesity


Is this a current diagnosis for this admission?: Yes   





- Plan Summary


Summary: 


Patient is admitted to the medical floor where she will receive routine 

supportive and symptomatic cares.  She will receive an aggressive pulmonary 

toilet utilizing nebulized Xopenex, Atrovent and Pulmicort.  She will be 

continued on IV Solu-Medrol 40 mg every 6 hours x 3 doses to complete her 

initial burst therapy dosing.  She will receive supplemental oxygen via nasal 

cannula and/or advanced airway pressure support devices such as BiPAP or CPAP as

needed to maintain an adequate oxygen saturation.  Patient will be continued on 

her usual medications, if appropriate, once her medication list has been 

verified and reconciled.  A hemoglobin A1c, lipid profile and thyroid profile 

will be obtained.  CBCs, metabolic profiles and magnesium levels will be 

obtained as appropriate.  A registered dietitian consultation will be obtained 

to aid the patient in the management of her diabetes and obesity.  Patient will 

be placed on a diabetic and cardiac restricted diet.





- Time


Time Spent with patient: 25-34 minutes


Medications reviewed and adjusted accordingly: Yes


Anticipated discharge: Home





- Inpatient Certification


Based on my medical assessment, after consideration of the patient's 

comorbidities, presenting symptoms, or acuity I expect that the services needed 

warrant INPATIENT care.: Yes


I certify that my determination is in accordance with my understanding of 

Medicare's requirements for reasonable and necessary INPATIENT services [42 CFR 

412.3e].: Yes


Medical Necessity: Significant Comorbidiites Make Outpatient Treatment Too 

Risky, Need Close Monitoring Due to Risk of Patient Decompensation, Need for 

Nebulizer Therapy and Monitoring of Response, Risk of Complication if Not Cared 

For in Hospital

## 2020-03-23 NOTE — RADIOLOGY REPORT (SQ)
EXAM DESCRIPTION: 



X-RAY CHEST TWO VIEWS



CLINICAL HISTORY: 



44 years, Female, sob



COMPARISON: 



None.



FINDINGS: 



PA and lateral chest radiographs were performed at 0034 hours on

3/23/2020. The lungs are well expanded and clear. The

costophrenic sulci are sharp. The cardiac silhouette, hilar

regions, trachea, soft tissues and bony structures are

unremarkable aside from degenerative changes.



IMPRESSION: 



No acute cardiopulmonary disease.

## 2020-03-24 NOTE — PDOC DISCHARGE SUMMARY
Impression





- Admit/DC Date/PCP


Admission Date/Primary Care Provider: 


  03/23/20 02:59





  





Discharge Date: 03/24/20





- Discharge Diagnosis


(1) Chronic asthma with acute exacerbation


Is this a current diagnosis for this admission?: Yes   





(2) Acute respiratory failure with hypoxia


Is this a current diagnosis for this admission?: Yes   





(3) Familial hyperlipidemia


Is this a current diagnosis for this admission?: Yes   





(4) Hypertension


Is this a current diagnosis for this admission?: Yes   





(5) Hyperthyroidism


Is this a current diagnosis for this admission?: Yes   





(6) Morbid obesity


Is this a current diagnosis for this admission?: Yes   





(7) Type 2 diabetes mellitus with obesity


Is this a current diagnosis for this admission?: Yes   





- Assessment


Summary: 


.





- Additional Information


Resuscitation Status: Full Code


Discharge Diet: Regular


Discharge Activity: Slowly Increase Activity


Referrals: 


[facility] [Outside] (CLINIC IS NOT SCHEDULING ANY IN CLINIC APPTS. AS OF 

NOW. INFORMATION WAS GIVING TO CLINIC STAFF AND THEY WILL CALL PATIENT.)


Prescriptions: 


Prednisone [Deltasone 20 mg Tablet] 40 mg PO DAILY 4 Days  tablet


Budesonide/Formoterol Fumarate [Symbicort Hfa 160-4.5 Mcg Inhaler 6 gm] 1 puff 

IH Q12 #1 inhaler


Home Medications: 








Albuterol Sulfate [Proair HFA Inhalation Aerosol 8.5 gm MDI] 2 puff IH Q4HP PRN 

10/31/19 


Methimazole [Tapazole 5 mg Tablet] 5 mg PO DAILY #30 tablet 11/01/19 


Albuterol Sulfate [Ventolin 0.083% Neb 2.5 mg/3 mL Ampul] 1 vial NEB Q4 03/23/20




Atorvastatin Calcium [Lipitor 40 mg Tablet] 20 mg PO QHS 03/23/20 


Carvedilol [Coreg 12.5 mg Tablet] 12.5 mg PO BID 03/23/20 


Glipizide [Glucotrol 5 mg Tablet] 5 mg PO DAILY 03/23/20 


Theophylline Anhydrous [Constantino-Dur 300 mg Tab.sr] 300 mg PO BID 03/23/20 


Budesonide/Formoterol Fumarate [Symbicort Hfa 160-4.5 Mcg Inhaler 6 gm] 1 puff 

IH Q12 #1 inhaler 03/24/20 


Prednisone [Deltasone 20 mg Tablet] 40 mg PO DAILY 4 Days  tablet 03/24/20 











History of Present Illiness


History of Present Illness: 


BRET LABOY is a 44 year old female who presented to the emergency room with 

a 1 day history of dyspnea.  She admits initially developing mild tightness in 

her breathing causing dyspnea on the late evening of 3/21/2020.  Despite use of 

her nebulized regimen and vigilant taking of her medications, she progressively 

worsened and her response to her home nebulizer treatments became negligible 

thus causing her to present to the hospital for assistance with her now severe 

dyspnea.  She admits the sudden onset of severe dyspnea associated with wheezing

and air hunger and accompanied by the requirement for use of accessory muscles 

of respiration in order to breathe.  She denies other associated or accompanying

signs and symptoms.  She admits numerous prior similar episodes due to her 

chronic severe extrinsic asthma.  She has not identified any aggravating or 

ameliorating factors for her asthma.  In the emergency room she was found to be 

in acute hypoxic respiratory failure and was treated with supplemental oxygen 

via nasal cannula and given intravenous Solu-Medrol as well as several nebulizer

therapy treatments.  She responded in part to the treatment with an improvement 

however she was unable to tolerate any exertion without supplemental oxygen 

which she does not have available at home.  Patient was subsequently admitted to

the hospital for further evaluation and treatment.





Hospital Course


Hospital Course: 


Patient on presentation was noted to be hypoxic even dropping to 90% on 3 L 

nasal cannula.  Physical exam revealed significant wheezing.  She was also noted

to be tachypneic with some work of breathing and placed on the BiPAP.  Chest x-

ray was normal.  Patient was started on treatment for acute asthma exacerbation 

and placed on frequent nebulizer treatments, IV Solu-Medrol which was later 

transitioned to oral prednisone.  Her breathing improved significantly and she 

was able to be taken off the BiPAP yesterday morning.  Patient's breathing and 

respiratory status continues to improve and today patient has been able to come 

off all oxygen supplement and is maintaining adequate sats on room air when 

examined in the room this morning.  Patient also states that she was able to 

walk around and she did not feel short of breath when doing so.  Patient 

endorses that she has her albuterol nebulizer solution and her nebulizer machine

which have instructed her to use about 4 times a day for the next 5 days and 

then go back to using on an as-needed basis.  Patient is being discharged with 

prednisone to be taken for 4 days as well as Symbicort inhaler.





Physical Exam


Vital Signs: 


                                        











Temp Pulse Resp BP Pulse Ox


 


 98.0 F   112 H  18   109/63   92 


 


 03/24/20 07:52  03/24/20 08:27  03/24/20 08:27  03/24/20 07:52  03/24/20 08:27








                                 Intake & Output











 03/23/20 03/24/20 03/25/20





 06:59 06:59 06:59


 


Intake Total 150 2277 


 


Balance 150 2277 


 


Weight 121.6 kg 123.5 kg 123.5 kg











General appearance: PRESENT: no acute distress, cooperative, morbidly obese.  

ABSENT: mild distress, severe distress


Neck exam: ABSENT: JVD


Respiratory exam: PRESENT: symmetrical, unlabored, wheezes - Improved from 

yesterday.  ABSENT: accessory muscle use, rales, retraction, tachypnea


Musculoskeletal exam: PRESENT: ambulatory


Neurological exam: PRESENT: alert, awake, oriented to person, oriented to place,

oriented to time


Psychiatric exam: ABSENT: agitated, anxious





Results


Laboratory Results: 


                                        











WBC  25.1 10^3/uL (4.0-10.5)  H  03/24/20  06:35    


 


RBC  4.28 10^6/uL (3.72-5.28)   03/24/20  06:35    


 


Hgb  12.3 g/dL (12.0-15.5)   03/24/20  06:35    


 


Hct  38.1 % (36.0-47.0)   03/24/20  06:35    


 


MCV  89 fl (80-97)   03/24/20  06:35    


 


MCH  28.7 pg (27.0-33.4)   03/24/20  06:35    


 


MCHC  32.2 g/dL (32.0-36.0)   03/24/20  06:35    


 


RDW  14.9 % (11.5-14.0)  H  03/24/20  06:35    


 


Plt Count  368 10^3/uL (150-450)   03/24/20  06:35    


 


Sodium  137.1 mmol/L (137-145)   03/24/20  06:35    


 


Potassium  4.3 mmol/L (3.6-5.0)   03/24/20  06:35    


 


Chloride  103 mmol/L ()   03/24/20  06:35    


 


Carbon Dioxide  23 mmol/L (22-30)   03/24/20  06:35    


 


Anion Gap  11  (5-19)   03/24/20  06:35    


 


BUN  19 mg/dL (7-20)   03/24/20  06:35    


 


Creatinine  0.66 mg/dL (0.52-1.25)   03/24/20  06:35    


 


Est GFR ( Amer)  > 60  (>60)   03/24/20  06:35    


 


Est GFR (MDRD) Non-Af  > 60  (>60)   03/24/20  06:35    


 


Glucose  206 mg/dL ()  H  03/24/20  06:35    


 


POC Glucose  195 mg/dL ()  H  03/24/20  05:21    


 


Hemoglobin A1c %  8.0 % (4.7-6.0)  H  03/23/20  06:04    


 


Calcium  9.5 mg/dL (8.4-10.2)   03/24/20  06:35    


 


Magnesium  2.3 mg/dL (1.6-2.3)   03/24/20  06:35    


 


Triglycerides  72 mg/dL (<150)   03/23/20  06:04    


 


Cholesterol  227.58 mg/dL (0-200)  H  03/23/20  06:04    


 


LDL Cholesterol Direct  139 mg/dL (<100)  H  03/23/20  06:04    


 


VLDL Cholesterol  14.0 mg/dL (10-31)   03/23/20  06:04    


 


HDL Cholesterol  89 mg/dL (>40)   03/23/20  06:04    


 


TSH  0.46 uIU/mL (0.47-4.68)  L  03/23/20  06:04    


 


Free T3 pg/mL  3.23 pg/mL (2.77-5.27)   03/23/20  06:04    











Impressions: 


                                        





Chest X-Ray  03/22/20 23:33


IMPRESSION: 


 


No acute cardiopulmonary disease.


 














Plan


Time Spent: Less than 30 Minutes





Stroke


Is this a Stroke Patient?: No





Acute Heart Failure





- **


Is this a Heart Failure Patient?: No

## 2020-04-13 NOTE — ER DOCUMENT REPORT
ED Respiratory Problem





- General


Chief Complaint: Asthma Exacerbation


Stated Complaint: MED REFILL/ASTHMA MED


Time Seen by Provider: 04/13/20 15:26


Primary Care Provider: 


[facility] [Provider Group] - Follow up in 3-5 days


Notes: 





Patient is a 44-year-old female who presents emergency department with a chief 

complaint of shortness of breath.  Patient has a history of asthma and COPD.  

Patient states she ran out of her prednisone.  She normally takes 20 mg daily.  

She ran out of her medicine yesterday.  She has been using her albuterol inhaler

to help with her shortness of breath, but has had little relief.  Denies any 

chest pain.


TRAVEL OUTSIDE OF THE [place] IN LAST 30 DAYS: No





- Related Data


Allergies/Adverse Reactions: 


                                        





Sulfa (Sulfonamide Antibiotics) Allergy (Verified 04/13/20 15:24)


   











Past Medical History





- General


Information source: Patient





- Social History


Smoking Status: Never Smoker


Family History: None





- Past Medical History


Cardiac Medical History: Reports: Hx Hypercholesterolemia - A familial 

hyperlipidemia variant is strongly suspected, Hx Hypertension


   Denies: Hx Atrial Fibrillation, Hx Congestive Heart Failure, Hx Coronary 

Artery Disease, Hx DVT, Hx Heart Attack, Hx Pulmonary Embolism


Pulmonary Medical History: Reports: Hx Asthma - Severe chronic extrinsic asthma,

Hx Pneumonia, Hx Respiratory Failure


   Denies: Hx Bronchitis, Hx COPD, Hx Intubation, Hx Tuberculosis


Neurological Medical History: Denies: Hx Cerebrovascular Accident, Hx Seizures


Endocrine Medical History: Reports: Hx Diabetes Mellitus Type 2 - Borderline 

diabetes mellitus type 2 per patient, Hx Hyperthyroidism.  Denies: Hx Diabetes 

Mellitus Type 1, Hx Hypothyroidism


Renal/ Medical History: Reports: Hx Kidney Stones.  Denies: Hx Peritoneal 

Dialysis


GI Medical History: Reports: Hx Gastroesophageal Reflux Disease.  Denies: Hx 

Cirrhosis, Hx Crohn's Disease, Hx Hepatitis, Hx Hiatal Hernia, Hx Ulcerative 

Colitis


Musculoskeletal Medical History: Denies Hx Arthritis


Skin Medical History: Denies Hx Eczema, Denies Hx Psoriasis


Psychiatric Medical History: 


   Denies: Hx Depression


Infectious Medical History: Denies: Hx Hepatitis


Past Surgical History: Reports: Hx Breast Surgery - left breast lump removed, Hx

Genitourinary Surgery - Bladder tuck, Hx Hysterectomy, Hx Orthopedic Surgery - 

Right ankle: open reduction with internal fixation, Hx Tubal Ligation.  Denies: 

Hx Pacemaker





- Immunizations


Hx Diphtheria, Pertussis, Tetanus Vaccination: Yes


Hx Pneumococcal Vaccination: 03/30/15





Review of Systems





- Review of Systems


Notes: 





REVIEW OF SYSTEMS:





CONSTITUTIONAL :    Denies recent illness.  Denies recent unintentional weight 

loss.  Denies fever,  chills, or sweats. 


EENT: Denies eye, ear, throat, or mouth pain, discharge, or symptoms.  Denies 

nasal or sinus congestion.


CARDIOVASCULAR:  Denies chest pain.


RESPIRATORY: See HPI. 


GASTROINTESTINAL: Denies nausea, vomiting, and diarrhea.  Denies abdominal pain.

 Denies constipation. 


GENITOURINARY:  Denies difficulty urinating, burning, blood in urine, urgency or

frequency.


MUSCULOSKELETAL:  Denies neck and back pain.  Denies joint pain or swelling.


SKIN:   Denies rash, itchiness, or lesions


HEMATOLOGIC :   Denies easy bruising or bleeding.


LYMPHATIC:  Denies swollen, painful, enlarged glands.


NEUROLOGICAL: Denies no numbness or tingling denies weakness.  Denies headache. 

Denies altered mental status.  Denies alteration in speech.


PSYCHIATRIC:  Denies stress, anxiety, alteration in sleep patterns, or 

depression.





All other systems reviewed and negative.





Physical Exam





- Vital signs


Vitals: 


                                        











Temp Pulse Resp BP Pulse Ox


 


 98.3 F   96   22 H  138/86 H  97 


 


 04/13/20 15:10  04/13/20 15:10  04/13/20 15:10  04/13/20 15:10  04/13/20 15:10














- Notes


Notes: 





PHYSICAL EXAMINATION:





GENERAL: Appears well, healthy, well-nourished, no acute distress. 





HEAD:  Normocephalic, atraumatic.





EYES:  PERRL, conjunctiva normal, all extraocular movements intact, sclera 

nonicteric





ENT:  Moist mucous membranes. 





NECK: Supple, no noticeable swelling, redness, rash.  Normal range of motion.





LUNGS: Inspiratory and expiratory wheezes noted throughout all lung fields. 





CARDIOVASCULAR: S1-S2, regular rate, regular rhythm.  Radial pulses 2+, normal.





ABDOMEN: Normoactive bowel sounds.  Soft, nontender,  no guarding, no rebound 

tenderness, and no masses palpated.





EXTREMITIES: Normal strength and range of motion, no pitting or edema.  No 

cyanosis. 





NEUROLOGICAL: Moves all extremities upon command.  Strength 5/5 in all 

extremities. 





PSYCH: Normal mood, normal affect.





SKIN: Warm, dry.  No rash, lesions, ulcerations noted.  Normal skin turgor.





Course





- Re-evaluation


Re-evalutation: 





04/13/20 16:59


Patient states that she feels better after receiving terbutaline and prednisone.

 We will continue her normal prednisone at home.  Chest x-ray is normal.  No 

pneumonia noted.  She will follow-up with [facility].  Follow-up 

precautions were given.  Verbal discharge instructions were given to the 

patient.  They verbalized understanding.  They are stable for discharge.














- Vital Signs


Vital signs: 


                                        











Temp Pulse Resp BP Pulse Ox


 


 98.6 F   99   20   127/71 H  96 


 


 04/13/20 17:32  04/13/20 17:32  04/13/20 17:32  04/13/20 17:32  04/13/20 17:32














Discharge





- Discharge


Clinical Impression: 


Asthma exacerbation


Qualifiers:


 Asthma severity: mild Asthma persistence: intermittent Qualified Code(s): 

J45.21 - Mild intermittent asthma with (acute) exacerbation





Condition: Stable


Disposition: HOME, SELF-CARE


Instructions:  Asthma ([facility]), Inhaled Bronchodilators ([facility])


Additional Instructions: 


You are seen today in the emergency department for your asthma.  Please follow-

up with the [facility] in regards to this visit.  You are being 

restarted on your steroids.  Take them as directed.


Prescriptions: 


Prednisone [Deltasone 20 mg Tablet] 1 tab PO DAILY #30 tablet


Referrals: 


[facility] [Provider Group] - Follow up in 3-5 days

## 2020-04-13 NOTE — RADIOLOGY REPORT (SQ)
EXAM DESCRIPTION:  CHEST SINGLE VIEW



IMAGES COMPLETED DATE/TIME:  4/13/2020 4:18 pm



REASON FOR STUDY:  shortness of breath



COMPARISON:  PA and lateral views of the chest from 3/23/2020.



EXAM PARAMETERS:  NUMBER OF VIEWS: One view

TECHNIQUE:  An AP view of the chest was obtained.

RADIATION DOSE: NA

LIMITATIONS: None.



FINDINGS:  LUNGS AND PLEURA: No consolidation, pleural effusion or pneumothorax.

MEDIASTINUM AND HILAR STRUCTURES: No mediastinal or hilar contour abnormality.

HEART AND VASCULAR STRUCTURES: The cardiac silhouette and pulmonary vasculature are within normal olivares
its.

BONES: No acute findings.

HARDWARE: None in the chest.

OTHER: No other finding.



IMPRESSION:  No acute cardiopulmonary process.



TECHNICAL DOCUMENTATION:  JOB ID:  [id number]

 2011 [employer]- All Rights Reserved



Reading location - IP/workstation name: [email or web address]

## 2020-07-17 NOTE — ER DOCUMENT REPORT
HPI





- HPI


Patient complains to provider of: Loss of taste


Time Seen by Provider: 07/17/20 22:30


Onset: Other - This 45-year-old female presented to the emergency room today 

stating she had a loss of taste and generalized intermittent malaise for the 

last 48 hours


Onset/Duration: Intermittent


Severity: Moderate


Associated Symptoms: None


Exacerbated by: Denies


Relieved by: Denies


Similar symptoms previously: No


Recently seen / treated by doctor: No





- REPRODUCTIVE


Reproductive: DENIES: Pregnant:





Past Medical History





- General


Information source: Patient





- Social History


Smoking Status: Never Smoker


Cigarette use (# per day): No


Chew tobacco use (# tins/day): No


Smoking Education Provided: No


Frequency of alcohol use: Rare


Drug Abuse: None


Family History: None





- Past Medical History


Cardiac Medical History: Reports: Hx Hypercholesterolemia - A familial hy

perlipidemia variant is strongly suspected, Hx Hypertension


   Denies: Hx Atrial Fibrillation, Hx Congestive Heart Failure, Hx Coronary 

Artery Disease, Hx DVT, Hx Heart Attack, Hx Pulmonary Embolism


Pulmonary Medical History: Reports: Hx Asthma - Severe chronic extrinsic asthma,

Hx Pneumonia, Hx Respiratory Failure


   Denies: Hx Bronchitis, Hx COPD, Hx Intubation, Hx Tuberculosis


Neurological Medical History: Denies: Hx Cerebrovascular Accident, Hx Seizures


Endocrine Medical History: Reports: Hx Diabetes Mellitus Type 2 - Borderline 

diabetes mellitus type 2 per patient, Hx Hyperthyroidism.  Denies: Hx Diabetes 

Mellitus Type 1, Hx Hypothyroidism


Renal/ Medical History: Reports: Hx Kidney Stones.  Denies: Hx Peritoneal 

Dialysis


GI Medical History: Reports: Hx Gastroesophageal Reflux Disease.  Denies: Hx 

Cirrhosis, Hx Crohn's Disease, Hx Hepatitis, Hx Hiatal Hernia, Hx Ulcerative 

Colitis


Musculoskeletal Medical History: Denies Hx Arthritis


Skin Medical History: Denies Hx Eczema, Denies Hx Psoriasis


Psychiatric Medical History: 


   Denies: Hx Depression


Infectious Medical History: Denies: Hx Hepatitis


Past Surgical History: Reports: Hx Breast Surgery - left breast lump removed, Hx

Genitourinary Surgery - Bladder tuck, Hx Hysterectomy, Hx Orthopedic Surgery - 

Right ankle: open reduction with internal fixation, Hx Tubal Ligation.  Denies: 

Hx Pacemaker





- Immunizations


Hx Diphtheria, Pertussis, Tetanus Vaccination: Yes


Hx Pneumococcal Vaccination: 03/30/15





Vertical Provider Document





- CONSTITUTIONAL


Agree With Documented VS: Yes





- INFECTION CONTROL


TRAVEL OUTSIDE OF THE [place] IN LAST 30 DAYS: No





- HEENT


HEENT: Atraumatic, Normocephalic, PERRLA





- NECK


Neck: Normal Inspection





- RESPIRATORY


Respiratory: Breath Sounds Normal, No Respiratory Distress





- CARDIOVASCULAR


Cardiovascular: Regular Rate, Regular Rhythm





- GI/ABDOMEN


Gastrointestinal: Abdomen Soft, Abdomen Non-Tender





- BACK


Back: Normal Inspection





- MUSCULOSKELETAL/EXTREMETIES


Musculoskeletal/Extremeties: MAEW





- DERM


Integumentary: Warm





Course





- Re-evaluation


Re-evalutation: 





07/17/20 23:42


No chest pain no shortness of breath no exertional chest pain no exertional 

shortness of breath been the patient had a long conversation about COVID self-

isolation increasing fluid antipyretics as needed follow-up with PMD in 3 to 5 

days.





- Laboratory


Result Diagrams: 


                                 07/17/20 22:36





                                 07/17/20 22:36


Laboratory results interpreted by me: 





07/17/20 23:42


                              Labs- All tests 24 hr











  07/17/20 07/17/20 07/17/20





  22:36 22:36 22:36


 


WBC  13.0 H  


 


RBC  4.50  


 


Hgb  13.2  


 


Hct  40.9  


 


MCV  91  


 


MCH  29.4  


 


MCHC  32.4  


 


RDW  14.1 H  


 


Plt Count  332  


 


Lymph % (Auto)  7.6 L  


 


Mono % (Auto)  1.0 L  


 


Eos % (Auto)  0.4  


 


Baso % (Auto)  0.9  


 


Absolute Neuts (auto)  11.7 H  


 


Absolute Lymphs (auto)  1.0  


 


Absolute Monos (auto)  0.1  


 


Absolute Eos (auto)  0.0  


 


Absolute Basos (auto)  0.1  


 


Seg Neutrophils %  90.1 H  


 


Sodium   134.1 L 


 


Potassium   4.6 


 


Chloride   105 


 


Carbon Dioxide   21 L 


 


Anion Gap   8 


 


BUN   7 


 


Creatinine   0.68 


 


Est GFR ( Amer)   > 60 


 


Est GFR (MDRD) Non-Af   > 60 


 


Glucose   310 H 


 


Calcium   9.6 


 


Total Bilirubin   0.4 


 


Direct Bilirubin   0.0 


 


Neonat Total Bilirubin   Not Reportable 


 


Neonat Direct Bilirubin   Not Reportable 


 


Neonat Indirect Bili   Not Reportable 


 


AST   30 


 


ALT   25 


 


Alkaline Phosphatase   123 


 


Troponin I    < 0.012


 


Total Protein   7.4 


 


Albumin   4.2 














- Diagnostic Test


Radiology results interpreted by me: 





07/17/20 23:42


                                        





Chest X-Ray  07/17/20 20:34


IMPRESSION:


 


No evidence of acute cardiopulmonary disease.


 














Discharge





- Discharge


Clinical Impression: 


 Viral respiratory illness





Condition: Good


Disposition: HOME, SELF-CARE


Instructions:  Acetaminophen, Fever ([facility]), Viral Syndrome ([facility]), Upper Res

piratory Illness ([facility])


Additional Instructions: 


Patient should self isolate pending COVID testing increase fluid intake 

antipyretics as needed follow-up with PMD return for any change worsening 

condition.


Prescriptions: 


Ibuprofen [Motrin 600 Mg Tablet] 600 mg PO TID #15 tablet


Forms:  Return to Work

## 2020-07-17 NOTE — RADIOLOGY REPORT (SQ)
XR CHEST 1 VIEW



HISTORY: Chest pain.



COMPARISON: 4/13/2020



FINDINGS:



The heart size is within normal limits. There is no pulmonary

vascular congestion. No consolidation, pleural effusion, or

pneumothorax is seen. The bony structures are preserved.



IMPRESSION:



No evidence of acute cardiopulmonary disease.

## 2020-09-22 NOTE — RADIOLOGY REPORT (SQ)
EXAM DESCRIPTION:  CHEST 2 VIEWS



IMAGES COMPLETED DATE/TIME:  9/22/2020 11:39 am



REASON FOR STUDY:  Palpitations, intermittent chest pain



COMPARISON:  7/17/2020.



EXAM PARAMETERS:  NUMBER OF VIEWS: two views

TECHNIQUE: Digital Frontal and Lateral radiographic views of the chest acquired.

RADIATION DOSE: NA

LIMITATIONS: none



FINDINGS:  LUNGS AND PLEURA: No opacities, masses or pneumothorax. No pleural effusion.

MEDIASTINUM AND HILAR STRUCTURES: No masses or contour abnormalities.

HEART AND VASCULAR STRUCTURES: Heart normal size.  No evidence for failure.

BONES: No acute findings.

HARDWARE: None in the chest.

OTHER: No other significant finding.



IMPRESSION:  NO ACUTE RADIOGRAPHIC FINDING IN THE CHEST.



TECHNICAL DOCUMENTATION:  JOB ID:  [id number]

 2011 [employer]- All Rights Reserved



Reading location - IP/workstation name: [email or web address]

## 2020-09-22 NOTE — ER DOCUMENT REPORT
ED General





- General


Chief Complaint: Palpitations


Stated Complaint: PALPITATIONS


Time Seen by Provider: 09/22/20 11:02


TRAVEL OUTSIDE OF THE [place] IN LAST 30 DAYS: No





- HPI


Notes: 





Patient is a 45-year-old female who presents to the emergency department for 

evaluation of the sensation of palpitations that are heart was racing.  She 

states this started suddenly at 9 AM.  She states it has improved since then.  

She used her phone to take her heart rate, states it was as high as 138.  She 

states that the sensation has improved, for a while she did feel like she had a 

weight on her chest.  She reports a similar sensation a few days ago.  Neither 

of these sensations were associated with exertion.  She states she does feel 

slightly short of breath with this as well.  She states she took her regular me

dications as prescribed this morning.  She did also use her asthma medication.  

The patient has been on chronic prednisone for several years.  Patient denies 

any personal history of blood clots.  She has had no recent prolonged 

immobilization or surgeries.  No history of cancer.





- Related Data


Allergies/Adverse Reactions: 


                                        





Sulfa (Sulfonamide Antibiotics) Allergy (Verified 04/13/20 15:24)


   











Past Medical History





- General


Information source: Patient





- Social History


Smoking Status: Never Smoker


Frequency of alcohol use: None


Drug Abuse: None


Family History: Other - Patient adopted, unsure





- Past Medical History


Cardiac Medical History: Reports: Hx Hypercholesterolemia - A familial 

hyperlipidemia variant is strongly suspected, Hx Hypertension


   Denies: Hx Atrial Fibrillation, Hx Congestive Heart Failure, Hx Coronary 

Artery Disease, Hx DVT, Hx Heart Attack, Hx Pulmonary Embolism


Pulmonary Medical History: Reports: Hx Asthma - Severe chronic extrinsic asthma,

Hx Pneumonia, Hx Respiratory Failure


   Denies: Hx Bronchitis, Hx COPD, Hx Intubation, Hx Tuberculosis


Neurological Medical History: Denies: Hx Cerebrovascular Accident, Hx Seizures


Endocrine Medical History: Reports: Hx Diabetes Mellitus Type 2 - Borderline 

diabetes mellitus type 2 per patient, Hx Hyperthyroidism.  Denies: Hx Diabetes 

Mellitus Type 1, Hx Hypothyroidism


Renal/ Medical History: Reports: Hx Kidney Stones.  Denies: Hx Peritoneal 

Dialysis


GI Medical History: Reports: Hx Gastroesophageal Reflux Disease.  Denies: Hx 

Cirrhosis, Hx Crohn's Disease, Hx Hepatitis, Hx Hiatal Hernia, Hx Ulcerative 

Colitis


Musculoskeletal Medical History: Denies Hx Arthritis


Skin Medical History: Denies Hx Eczema, Denies Hx Psoriasis


Psychiatric Medical History: 


   Denies: Hx Depression


Infectious Medical History: Denies: Hx Hepatitis


Past Surgical History: Reports: Hx Breast Surgery - left breast lump removed, Hx

Genitourinary Surgery - Bladder tuck, Hx Hysterectomy, Hx Orthopedic Surgery - 

Right ankle: open reduction with internal fixation, Hx Tubal Ligation.  Denies: 

Hx Pacemaker





- Immunizations


Hx Diphtheria, Pertussis, Tetanus Vaccination: Yes


Hx Pneumococcal Vaccination: 03/30/15





Review of Systems





- Review of Systems


Constitutional: No symptoms reported


EENT: No symptoms reported


Cardiovascular: See HPI


Respiratory: See HPI


Gastrointestinal: No symptoms reported


Genitourinary: No symptoms reported


Musculoskeletal: No symptoms reported


Skin: No symptoms reported


Neurological/Psychological: No symptoms reported





Physical Exam





- Vital signs


Vitals: 


                                        











Temp Pulse Resp BP Pulse Ox


 


 98.4 F   124 H  18   133/80 H  96 


 


 09/22/20 10:19  09/22/20 10:19  09/22/20 10:19  09/22/20 10:19 09/22/20 10:19














- Notes


Notes: 





This is a very pleasant 45-year-old female who appears her stated age, no acute 

distress.  Vital signs reviewed, please refer to chart. Head is normocephalic, 

atraumatic.  Pupils equal round, reactive to light.  Neck is supple without 

meningismus.  Heart reveals mild tachycardia with normal S1, S2.  Lungs are 

clear to auscultation bilaterally.  Abdomen is soft, nontender, normoactive 

bowel sounds throughout.  Extremities without cyanosis, clubbing. Posterior 

calves are nontender.  Peripheral pulses are equal.  Skin is warm and dry.  

Patient is awake, alert, neurological exam is nonfocal.





Course





- Re-evaluation


Re-evalutation: 





09/22/20 14:50


Patient presents to the emergency department for evaluation of palpitations.  On

arrival her heart rate was in the 120s.  She had laboratory investigations and 

imaging is ordered through triage.  Laboratory investigations revealed a 

leukocytosis, likely secondary to her chronic prednisone therapy.  The patient 

also has mild hyponatremia as well as a decrease in her bicarb.  I do suspect 

some mild dehydration, the patient was given IV fluids.  Otherwise, her initial 

troponin is undetectable.  A second troponin will be added given her multiple 

risk factors, although certainly anginal chest pain would be unlikely given the 

fact that it was nonexertional.  Her EKG fails to show any significant changes 

other than rate.  Patient is currently stable, awaiting second troponin.  We 

will continue to monitor.


09/22/20 17:06


Patient's repeat troponin is completely undetectable.  She has no symptoms at 

this time whatsoever.  She is only gotten about 300 cc of her fluids, her 

current heart rate is 105.  She is asymptomatic.  I do not have a clear etiology

for tachycardia, but she has no shortness of breath.  She actually has a 

diagnosis of tachycardia here in the hospital charting in the past.  It is 

sinus.  She was no other acute symptoms.  I told the patient that she needs to 

call her doctor tomorrow morning to establish follow-up as soon as possible.  

She voiced understanding.  Otherwise she is to continue to stay hydrated, take 

her home medications, and return to the ED with worsening or new concerning 

symptoms of any sort.





- Vital Signs


Vital signs: 


                                        











Temp Pulse Resp BP Pulse Ox


 


 98.4 F   124 H  21 H  117/89 H  93 


 


 09/22/20 10:19  09/22/20 10:19  09/22/20 16:01  09/22/20 16:01  09/22/20 16:01














- Laboratory


Result Diagrams: 


                                 09/22/20 11:25





                                 09/22/20 11:25


Laboratory results interpreted by me: 


                                        











  09/22/20 09/22/20 09/22/20





  11:15 11:25 11:25


 


WBC   18.4 H 


 


RDW   14.5 H 


 


Seg Neuts % (Manual)   93 H 


 


Lymphocytes % (Manual)   3 L 


 


Abs Neuts (Manual)   17.1 H 


 


Sodium    132.6 L


 


Carbon Dioxide    18 L


 


Glucose    362 H


 


Alkaline Phosphatase    136 H


 


Urine Glucose (UA)  >=500 H  


 


Urine Ketones  20 H  














- Diagnostic Test


Radiology reviewed: Reports reviewed


Radiology results interpreted by me: 





09/22/20 17:07





                                        





Chest X-Ray  09/22/20 11:10


IMPRESSION:  NO ACUTE RADIOGRAPHIC FINDING IN THE CHEST.


 














Discharge





- Discharge


Clinical Impression: 


 Tachycardia, Palpitations, Hyponatremia





Condition: Stable


Disposition: HOME, SELF-CARE


Instructions:  Palpitations (Irregular or Rapid Heartrate) ([facility]), Beta Blockers 

([facility])


Additional Instructions: 


Continue to take your regular medications as prescribed.  Follow-up with your 

primary care provider this week.  If you develop chest pain, increased 

discolored breathing, or any other new concerning symptoms, please return 

immediately to the ER for further evaluation.

## 2020-09-22 NOTE — ER DOCUMENT REPORT
ED Medical Screen (RME)





- General


Chief Complaint: Palpitations


Stated Complaint: PALPITATIONS


Time Seen by Provider: 09/22/20 11:02


TRAVEL OUTSIDE OF THE [place] IN LAST 30 DAYS: No





- HPI


Notes: 





09/22/20 11:11


45-year-old female with a history of asthma presents to the emergency room for 

complaints of sudden onset palpitations that started at 9 AM this morning.  She 

states that her heart rate was in the 130s.  States she also felt a sharp left 

sided chest pain that lasted only a few seconds and then went away, states a few

days ago she fell like someone was sitting on her chest for a short period of 

time.  Denies any prior history of palpitations.  Does take prednisone and 

albuterol treatments, she did give herself an albuterol treatment this morning. 

Denies any prior cardiac history. 





I have greeted and performed a rapid initial assessment of this patient.  A 

comprehensive ED assessment and evaluation of the patient, analysis of test 

results and completion of the medical decision making process will be conducted 

by additional ED providers.





PHYSICAL EXAMINATION:





GENERAL: Well-appearing, well-nourished and in no acute distress.





HEAD: Atraumatic, normocephalic.





EYES: Pupils equal round extraocular movements intact,  conjunctiva are normal.





NECK: Normal range of motion





CV: tachycardia


LUNGS: No respiratory distress








- Related Data


Allergies/Adverse Reactions: 


                                        





Sulfa (Sulfonamide Antibiotics) Allergy (Verified 04/13/20 15:24)


   











Past Medical History





- Social History


Frequency of alcohol use: None


Drug Abuse: None


Family history: Reviewed & Not Pertinent





- Past Medical History


Cardiac Medical History: Reports: Hx Hypercholesterolemia - A familial 

hyperlipidemia variant is strongly suspected, Hx Hypertension


   Denies: Hx Atrial Fibrillation, Hx Congestive Heart Failure, Hx Coronary 

Artery Disease, Hx DVT, Hx Heart Attack, Hx Pulmonary Embolism


Pulmonary Medical History: Reports: Hx Asthma - Severe chronic extrinsic asthma,

Hx Pneumonia, Hx Respiratory Failure


   Denies: Hx Bronchitis, Hx COPD, Hx Intubation, Hx Tuberculosis


Neurological Medical History: Denies: Hx Cerebrovascular Accident, Hx Seizures


Endocrine Medical History: Reports: Hx Diabetes Mellitus Type 2 - Borderline 

diabetes mellitus type 2 per patient, Hx Hyperthyroidism.  Denies: Hx Diabetes 

Mellitus Type 1, Hx Hypothyroidism


Renal/ Medical History: Reports: Hx Kidney Stones.  Denies: Hx Peritoneal 

Dialysis


GI Medical History: Reports: Hx Gastroesophageal Reflux Disease.  Denies: Hx 

Cirrhosis, Hx Crohn's Disease, Hx Hepatitis, Hx Hiatal Hernia, Hx Ulcerative 

Colitis


Musculoskeltal Medical History: Denies Hx Arthritis


Skin Medical History: Denies Hx Eczema, Denies Hx Psoriasis


Psychiatric Medical History: 


   Denies: Hx Depression


Infectious Medical History: Denies: Hx Hepatitis


Past Surgical History: Reports: Hx Breast Surgery - left breast lump removed, Hx

Genitourinary Surgery - Bladder tuck, Hx Hysterectomy, Hx Orthopedic Surgery - 

Right ankle: open reduction with internal fixation, Hx Tubal Ligation.  Denies: 

Hx Pacemaker





- Immunizations


Hx Diphtheria, Pertussis, Tetanus Vaccination: Yes





Physical Exam





- Vital signs


Vitals: 





                                        











Temp Pulse Resp BP Pulse Ox


 


 98.4 F   124 H  18   133/80 H  96 


 


 09/22/20 10:19  09/22/20 10:19  09/22/20 10:19 09/22/20 10:19  09/22/20 10:19














Course





- Vital Signs


Vital signs: 





                                        











Temp Pulse Resp BP Pulse Ox


 


 98.4 F   124 H  18   133/80 H  96 


 


 09/22/20 10:19  09/22/20 10:19  09/22/20 10:19  09/22/20 10:19  09/22/20 10:19

## 2020-09-22 NOTE — EKG REPORT
SEVERITY:- OTHERWISE NORMAL ECG -

SINUS TACHYCARDIA

:

Confirmed by: Mireille Cuello MD 22-Sep-2020 14:34:43